# Patient Record
Sex: MALE | Race: WHITE | HISPANIC OR LATINO
[De-identification: names, ages, dates, MRNs, and addresses within clinical notes are randomized per-mention and may not be internally consistent; named-entity substitution may affect disease eponyms.]

---

## 2017-05-02 ENCOUNTER — TRANSCRIPTION ENCOUNTER (OUTPATIENT)
Age: 42
End: 2017-05-02

## 2020-05-18 PROBLEM — Z00.00 ENCOUNTER FOR PREVENTIVE HEALTH EXAMINATION: Status: ACTIVE | Noted: 2020-05-18

## 2020-05-22 ENCOUNTER — APPOINTMENT (OUTPATIENT)
Dept: ORTHOPEDIC SURGERY | Facility: CLINIC | Age: 45
End: 2020-05-22
Payer: COMMERCIAL

## 2020-05-22 DIAGNOSIS — Z87.09 PERSONAL HISTORY OF OTHER DISEASES OF THE RESPIRATORY SYSTEM: ICD-10-CM

## 2020-05-22 DIAGNOSIS — Z78.9 OTHER SPECIFIED HEALTH STATUS: ICD-10-CM

## 2020-05-22 DIAGNOSIS — Z86.79 PERSONAL HISTORY OF OTHER DISEASES OF THE CIRCULATORY SYSTEM: ICD-10-CM

## 2020-05-22 DIAGNOSIS — Z72.3 LACK OF PHYSICAL EXERCISE: ICD-10-CM

## 2020-05-22 PROCEDURE — 73590 X-RAY EXAM OF LOWER LEG: CPT | Mod: LT,TC

## 2020-05-22 PROCEDURE — 73610 X-RAY EXAM OF ANKLE: CPT | Mod: LT,TC

## 2020-05-22 PROCEDURE — 99204 OFFICE O/P NEW MOD 45 MIN: CPT

## 2020-05-22 RX ORDER — ACETAMINOPHEN 325 MG/1
TABLET, FILM COATED ORAL
Refills: 0 | Status: ACTIVE | COMMUNITY

## 2020-05-22 RX ORDER — CEPHALEXIN 750 MG/1
CAPSULE ORAL
Refills: 0 | Status: ACTIVE | COMMUNITY

## 2020-05-22 RX ORDER — ASPIRIN 325 MG/1
TABLET, FILM COATED ORAL
Refills: 0 | Status: ACTIVE | COMMUNITY

## 2020-05-22 RX ORDER — AMLODIPINE BESYLATE 5 MG/1
TABLET ORAL
Refills: 0 | Status: ACTIVE | COMMUNITY

## 2020-05-22 NOTE — PHYSICAL EXAM
[de-identified] : Left Ankle Physical Examination:\par \par General: Alert and oriented x3.  In no acute distress.  Pleasant in nature with a normal affect.  No apparent respiratory distress. \par Erythema, Warmth, Rubor: Negative\par Swelling: Positive\par \par *There is an incision going across the anterior aspect of the ankle joint and medial side of the ankle joint. Nylon sutures are intact. There is no wound dehisced since, drainage, signs of infection present coming from the wounds. He also has small focal wounds on his anterior shin and around the ankle from the external fixator.  The patient has a Steinmann pin coming out of the calcaneus on the bottom aspect of the foot.\par \par ROM:\par Not assessed today in office.\par \par Tenderness to Palpation: \par 1. Lateral Malleolus: Negative\par 2. Medial Malleolus: Positive\par 3. Proximal Fibular Pain: Negative\par 4. Heel Pain: Positive Benson pin coming out of the calcaneus on the bottom portion of the foot.\par 5. Cuboid: Negative\par 6. Navicular: Negative\par 7. Tibiotalar Joint: Negative\par 8. Subtalar Joint: Negative\par 9. Posterior Recess: Negative\par \par Tendon Pain:\par 1. Achilles: Negative\par 2. Peroneals: Negative\par 3. Posterior Tibialis: Negative\par 4. Tibialis Anterior: Negative\par \par Ligament Pain:\par 1. ATFL: Negative\par 2. CFL: Negative \par 3. PTFL: Negative\par 4. Deltoid Ligaments: Negative\par 5. Lis Franc Ligament: Negative\par \par Stability: \par 1. Anterior Drawer: Negative\par 2. Posterior Drawer: Negative\par \par Strength: 5/5 TA/GS/EHL\par \par Pulses: 2+ DP/PT Pulses\par \par Neuro: Intact motor and sensory\par \par Additional Test:\par 1. Calcaneal Squeeze Test: Negative\par 2. Syndesmosis Squeeze Test: Negative\par  [de-identified] : X-rays of the left ankle taken in office today and reviewed with the patient showed: Ankle x-rays show cemented medial malleolus with a Steinmann pin coming from the bottom portion of the calcaneus through the subtalar joint, through the tibiotalar joint into the distal third of the tibia. Small possible fracture of the distal fibula.\par \par 2 views x-rays of the tib/fib showed screw holes present in the tibia from the external fixators. Small possible distal fracture of the fibula noted on both the ankle views and tib-fib views.
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
## 2020-05-22 NOTE — DISCUSSION/SUMMARY
[de-identified] : I had a long conversation with Jorge A and his wife today the office. At this point in time Jorge A is going to continue his DVT prophylaxis with aspirin 325 mg twice a day. He was told multiple times in the office today to continue nonweightbearing as he now notes that there is a pin sticking out the bottom of his heel. We are going to get a CT scan of the left ankle to evaluate the fractures and hardware. Once the CT scan is completed we will reconvene in the office to review the CT scan and to discuss possible surgical intervention of the ankle at that time. All of his and his wife questions were answered. They understand the treatment course at this time. They understand the pathology at this time. If they have additional questions they may give the office a call.

## 2020-05-22 NOTE — PHYSICAL EXAM
[de-identified] : Left Ankle Physical Examination:\par \par General: Alert and oriented x3.  In no acute distress.  Pleasant in nature with a normal affect.  No apparent respiratory distress. \par Erythema, Warmth, Rubor: Negative\par Swelling: Positive\par \par *There is an incision going across the anterior aspect of the ankle joint and medial side of the ankle joint. Nylon sutures are intact. There is no wound dehisced since, drainage, signs of infection present coming from the wounds. He also has small focal wounds on his anterior shin and around the ankle from the external fixator.  The patient has a Steinmann pin coming out of the calcaneus on the bottom aspect of the foot.\par \par ROM:\par Not assessed today in office.\par \par Tenderness to Palpation: \par 1. Lateral Malleolus: Negative\par 2. Medial Malleolus: Positive\par 3. Proximal Fibular Pain: Negative\par 4. Heel Pain: Positive Benson pin coming out of the calcaneus on the bottom portion of the foot.\par 5. Cuboid: Negative\par 6. Navicular: Negative\par 7. Tibiotalar Joint: Negative\par 8. Subtalar Joint: Negative\par 9. Posterior Recess: Negative\par \par Tendon Pain:\par 1. Achilles: Negative\par 2. Peroneals: Negative\par 3. Posterior Tibialis: Negative\par 4. Tibialis Anterior: Negative\par \par Ligament Pain:\par 1. ATFL: Negative\par 2. CFL: Negative \par 3. PTFL: Negative\par 4. Deltoid Ligaments: Negative\par 5. Lis Franc Ligament: Negative\par \par Stability: \par 1. Anterior Drawer: Negative\par 2. Posterior Drawer: Negative\par \par Strength: 5/5 TA/GS/EHL\par \par Pulses: 2+ DP/PT Pulses\par \par Neuro: Intact motor and sensory\par \par Additional Test:\par 1. Calcaneal Squeeze Test: Negative\par 2. Syndesmosis Squeeze Test: Negative\par  [de-identified] : X-rays of the left ankle taken in office today and reviewed with the patient showed: Ankle x-rays show cemented medial malleolus with a Steinmann pin coming from the bottom portion of the calcaneus through the subtalar joint, through the tibiotalar joint into the distal third of the tibia. Small possible fracture of the distal fibula.\par \par 2 views x-rays of the tib/fib showed screw holes present in the tibia from the external fixators. Small possible distal fracture of the fibula noted on both the ankle views and tib-fib views.

## 2020-05-22 NOTE — DISCUSSION/SUMMARY
[de-identified] : I had a long conversation with Jorge A and his wife today the office. At this point in time Jorge A is going to continue his DVT prophylaxis with aspirin 325 mg twice a day. He was told multiple times in the office today to continue nonweightbearing as he now notes that there is a pin sticking out the bottom of his heel. We are going to get a CT scan of the left ankle to evaluate the fractures and hardware. Once the CT scan is completed we will reconvene in the office to review the CT scan and to discuss possible surgical intervention of the ankle at that time. All of his and his wife questions were answered. They understand the treatment course at this time. They understand the pathology at this time. If they have additional questions they may give the office a call.
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
## 2020-05-30 ENCOUNTER — APPOINTMENT (OUTPATIENT)
Dept: CT IMAGING | Facility: CLINIC | Age: 45
End: 2020-05-30
Payer: COMMERCIAL

## 2020-05-30 ENCOUNTER — OUTPATIENT (OUTPATIENT)
Dept: OUTPATIENT SERVICES | Facility: HOSPITAL | Age: 45
LOS: 1 days | End: 2020-05-30
Payer: COMMERCIAL

## 2020-05-30 DIAGNOSIS — S82.892A OTHER FRACTURE OF LEFT LOWER LEG, INITIAL ENCOUNTER FOR CLOSED FRACTURE: ICD-10-CM

## 2020-05-30 PROCEDURE — 73700 CT LOWER EXTREMITY W/O DYE: CPT | Mod: 26,LT

## 2020-05-30 PROCEDURE — 73700 CT LOWER EXTREMITY W/O DYE: CPT

## 2020-06-02 ENCOUNTER — APPOINTMENT (OUTPATIENT)
Dept: ORTHOPEDIC SURGERY | Facility: CLINIC | Age: 45
End: 2020-06-02
Payer: COMMERCIAL

## 2020-06-02 PROCEDURE — 99214 OFFICE O/P EST MOD 30 MIN: CPT

## 2020-06-02 NOTE — PHYSICAL EXAM
[de-identified] : Left Ankle Physical Examination:\par \par General: Alert and oriented x3.  In no acute distress.  Pleasant in nature with a normal affect.  No apparent respiratory distress. \par Erythema, Warmth, Rubor: Negative\par Swelling: Positive\par \par *There is an incision going across the anterior aspect of the ankle joint and medial side of the ankle joint. Nylon sutures are intact. There is no wound dehisced since, drainage, signs of infection present coming from the wounds. He also has small focal wounds on his anterior shin and around the ankle from the external fixator.  The patient has a Steinmann pin coming out of the calcaneus on the bottom aspect of the foot.\par \par ROM:\par Not assessed today in office.\par \par Tenderness to Palpation: \par 1. Lateral Malleolus: Negative\par 2. Medial Malleolus: Positive\par 3. Proximal Fibular Pain: Negative\par 4. Heel Pain: Positive Benson pin coming out of the calcaneus on the bottom portion of the foot.\par 5. Cuboid: Negative\par 6. Navicular: Negative\par 7. Tibiotalar Joint: Negative\par 8. Subtalar Joint: Negative\par 9. Posterior Recess: Negative\par \par Tendon Pain:\par 1. Achilles: Negative\par 2. Peroneals: Negative\par 3. Posterior Tibialis: Negative\par 4. Tibialis Anterior: Negative\par \par Ligament Pain:\par 1. ATFL: Negative\par 2. CFL: Negative \par 3. PTFL: Negative\par 4. Deltoid Ligaments: Negative\par 5. Lis Franc Ligament: Negative\par \par Stability: \par 1. Anterior Drawer: Negative\par 2. Posterior Drawer: Negative\par \par Strength: 5/5 TA/GS/EHL\par \par Pulses: 2+ DP/PT Pulses\par \par Neuro: Intact motor and sensory\par \par Additional Test:\par 1. Calcaneal Squeeze Test: Negative\par 2. Syndesmosis Squeeze Test: Negative\par  [de-identified] : \par   CT Ankle No Cont, Left             Final\par \par No Documents Attached\par \par \par \par \par   EXAM:  CT ANKLE ONLY LT\par \par \par PROCEDURE DATE:  05/30/2020\par \par \par \par INTERPRETATION:  CT ANKLE LEFT dated 5/30/2020 9:56 AM\par \par INDICATION: History of ankle fracture and surgery. Ankle pain.\par \par COMPARISON: None available.\par \par TECHNIQUE: CT imaging of the left ankle was performed. The data was reformatted in the axial, coronal, and sagittal planes.\par \par FINDINGS:\par \par OSSEOUS STRUCTURES: There is a plantar percutaneous pin noted which enters the anterior calcaneus, traverses the subtalar and central tibiotalar joint and terminates in the distal tibial diaphysis. There is no lucency surrounding this hardware.  There are ghost tracts in the calcaneus likely from prior external fixation device removal. There is a comminuted medial malleolus fracture with cement interposed between the fracture fragments. There is no bony bridging or callus formation. There is a healing nondisplaced distal fibular diaphyseal fracture with mild periosteal bone formation. Small well-corticated osseous densities distal to the fibula in keeping with tiny avulsion. There is an impaction fracture at the fourth TMT distal cuboid articular surface. Mild avulsion of the anterior process of the calcaneus. The tibiotalar, posterior subtalar, middle subtalar, calcaneocuboid, and talonavicular joints are intact.\par SYNOVIUM/ JOINT FLUID: There is a tibiotalar joint effusion. There are small densities within the joint suggestive of small loose bodies.\par TENDONS: Intact\par MUSCLES: Intact\par SUBCUTANEOUS SOFT TISSUES: Diffuse soft tissue edema\par \par \par IMPRESSION:\par \par ORIF of the ankle with percutaneous pin in place. Ghost tracks in keeping with prior external fixation device removal. Cemented and medial malleoli are fracture without definitive osseous fusion.\par \par Healing distal fibular diaphyseal fracture.\par \par Intra-articular fracture without definite osseous bridging at the distal cuboid with its articulation of the fourth metatarsal base. This can be seen with a Lisfranc type fracture and correlation with clinical evaluation for ligamentous laxity at the Lisfranc ligament is recommended. If warranted, MRI can be performed.\par \par Avulsion of the anterior process of the calcaneus.\par \par Small avulsion of the fibular tip.\par \par Small loose bodies within the tibiotalar joint.\par \par \par \par \par \par \par LUIS NEWTON MD,RADIOLOGY FELLOW\par This document has been electronically signed.\par FRANCISCO JAVIER GROVE M.D., ATTENDING RADIOLOGIST\par This document has been electronically signed. Jun 1 2020 11:01AM\par \par  \par \par  Ordered by: SUKHWINDER DORADO       Collected/Examined: [id number] 09:56AM       \par Verified by: SUKHWINDER DORADO 01Jun2020 03:35PM       \par  Result Communication: No patient communication needed at this time;\par Stage: Final       \par  Performed at: [facility] [place]       Resulted: 01Jun2020 11:04AM       Last Updated: 01Jun2020 03:35PM       Accession: [id number]

## 2020-06-02 NOTE — HISTORY OF PRESENT ILLNESS
[FreeTextEntry1] : The patient is accompanied by his wife with regards to followup evaluation to the left ankle. He has been nonweightbearing and has been compliant with his overall structures. Continues to take anticoagulation as recommended. He denies any fevers chills or night sweats.

## 2020-06-02 NOTE — DISCUSSION/SUMMARY
[FreeTextEntry1] : I had a lengthy conversation with the patient regarding his left ankle fracture. I provided him with a discussion surrounding the medial malleolus antibiotic cement that was placed at the outside institution and the possible need for bone graft and the washouts due to the high likelihood of infection. At this point I will get in touch with him on our best method of moving forward from a surgical standpoint. He is aware that he will need medical clearance as well as coated testing.All questions were answered and the patient verbalized understanding.  The patient is in agreement with this treatment plan.

## 2020-06-04 ENCOUNTER — APPOINTMENT (OUTPATIENT)
Dept: ORTHOPEDIC SURGERY | Facility: CLINIC | Age: 45
End: 2020-06-04
Payer: COMMERCIAL

## 2020-06-04 PROCEDURE — 99442: CPT

## 2020-06-04 RX ORDER — OXYCODONE AND ACETAMINOPHEN 5; 325 MG/1; MG/1
5-325 TABLET ORAL
Qty: 20 | Refills: 0 | Status: ACTIVE | COMMUNITY
Start: 2020-05-12

## 2020-06-09 ENCOUNTER — OUTPATIENT (OUTPATIENT)
Dept: OUTPATIENT SERVICES | Facility: HOSPITAL | Age: 45
LOS: 1 days | End: 2020-06-09
Payer: COMMERCIAL

## 2020-06-09 DIAGNOSIS — Z11.59 ENCOUNTER FOR SCREENING FOR OTHER VIRAL DISEASES: ICD-10-CM

## 2020-06-09 PROCEDURE — U0003: CPT

## 2020-06-10 DIAGNOSIS — Z11.59 ENCOUNTER FOR SCREENING FOR OTHER VIRAL DISEASES: ICD-10-CM

## 2020-06-10 LAB — SARS-COV-2 RNA SPEC QL NAA+PROBE: SIGNIFICANT CHANGE UP

## 2020-06-11 NOTE — ASU PATIENT PROFILE, ADULT - REASON FOR ADMISSION, PROFILE
Left ankle hardware removal and medial ankle irrigation and debridement ,possible wound vac placement,bone biopsy media tibia

## 2020-06-12 ENCOUNTER — APPOINTMENT (OUTPATIENT)
Dept: ORTHOPEDIC SURGERY | Facility: HOSPITAL | Age: 45
End: 2020-06-12

## 2020-06-12 ENCOUNTER — OUTPATIENT (OUTPATIENT)
Dept: INPATIENT UNIT | Facility: HOSPITAL | Age: 45
LOS: 1 days | Discharge: HOME | End: 2020-06-12
Payer: COMMERCIAL

## 2020-06-12 ENCOUNTER — RESULT REVIEW (OUTPATIENT)
Age: 45
End: 2020-06-12

## 2020-06-12 VITALS
HEART RATE: 85 BPM | OXYGEN SATURATION: 98 % | TEMPERATURE: 98 F | SYSTOLIC BLOOD PRESSURE: 124 MMHG | DIASTOLIC BLOOD PRESSURE: 65 MMHG | RESPIRATION RATE: 15 BRPM

## 2020-06-12 VITALS
OXYGEN SATURATION: 100 % | WEIGHT: 214.95 LBS | HEIGHT: 68 IN | RESPIRATION RATE: 16 BRPM | SYSTOLIC BLOOD PRESSURE: 138 MMHG | HEART RATE: 96 BPM | TEMPERATURE: 99 F | DIASTOLIC BLOOD PRESSURE: 88 MMHG

## 2020-06-12 DIAGNOSIS — S82.892A OTHER FRACTURE OF LEFT LOWER LEG, INITIAL ENCOUNTER FOR CLOSED FRACTURE: ICD-10-CM

## 2020-06-12 DIAGNOSIS — Z98.890 OTHER SPECIFIED POSTPROCEDURAL STATES: ICD-10-CM

## 2020-06-12 DIAGNOSIS — Z98.890 OTHER SPECIFIED POSTPROCEDURAL STATES: Chronic | ICD-10-CM

## 2020-06-12 PROCEDURE — 87116 MYCOBACTERIA CULTURE: CPT

## 2020-06-12 PROCEDURE — 76000 FLUOROSCOPY <1 HR PHYS/QHP: CPT

## 2020-06-12 PROCEDURE — 87102 FUNGUS ISOLATION CULTURE: CPT

## 2020-06-12 PROCEDURE — 99202 OFFICE O/P NEW SF 15 MIN: CPT

## 2020-06-12 PROCEDURE — 11012 DEB SKIN BONE AT FX SITE: CPT | Mod: 59,LT

## 2020-06-12 PROCEDURE — 87015 SPECIMEN INFECT AGNT CONCNTJ: CPT

## 2020-06-12 PROCEDURE — 20680 REMOVAL OF IMPLANT DEEP: CPT | Mod: 59,LT

## 2020-06-12 PROCEDURE — 27603 DRAIN LOWER LEG LESION: CPT | Mod: LT

## 2020-06-12 PROCEDURE — 87206 SMEAR FLUORESCENT/ACID STAI: CPT

## 2020-06-12 PROCEDURE — 87075 CULTR BACTERIA EXCEPT BLOOD: CPT

## 2020-06-12 PROCEDURE — 87070 CULTURE OTHR SPECIMN AEROBIC: CPT

## 2020-06-12 RX ORDER — LANOLIN ALCOHOL/MO/W.PET/CERES
3 CREAM (GRAM) TOPICAL AT BEDTIME
Refills: 0 | Status: DISCONTINUED | OUTPATIENT
Start: 2020-06-12 | End: 2020-06-12

## 2020-06-12 RX ORDER — CEFAZOLIN SODIUM 1 G
2000 VIAL (EA) INJECTION EVERY 8 HOURS
Refills: 0 | Status: DISCONTINUED | OUTPATIENT
Start: 2020-06-12 | End: 2020-06-12

## 2020-06-12 RX ORDER — ENOXAPARIN SODIUM 100 MG/ML
40 INJECTION SUBCUTANEOUS ONCE
Refills: 0 | Status: DISCONTINUED | OUTPATIENT
Start: 2020-06-12 | End: 2020-06-12

## 2020-06-12 RX ORDER — OXYCODONE HYDROCHLORIDE 5 MG/1
5 TABLET ORAL ONCE
Refills: 0 | Status: DISCONTINUED | OUTPATIENT
Start: 2020-06-12 | End: 2020-06-12

## 2020-06-12 RX ORDER — SODIUM CHLORIDE 9 MG/ML
1000 INJECTION, SOLUTION INTRAVENOUS
Refills: 0 | Status: DISCONTINUED | OUTPATIENT
Start: 2020-06-12 | End: 2020-06-12

## 2020-06-12 RX ORDER — CEPHALEXIN 500 MG
0 CAPSULE ORAL
Qty: 0 | Refills: 0 | DISCHARGE

## 2020-06-12 RX ORDER — ENOXAPARIN SODIUM 100 MG/ML
40 INJECTION SUBCUTANEOUS
Qty: 14 | Refills: 1
Start: 2020-06-12 | End: 2020-07-09

## 2020-06-12 RX ORDER — ONDANSETRON 8 MG/1
1 TABLET, FILM COATED ORAL
Qty: 10 | Refills: 0
Start: 2020-06-12 | End: 2020-06-18

## 2020-06-12 RX ORDER — AMLODIPINE BESYLATE 2.5 MG/1
0 TABLET ORAL
Qty: 0 | Refills: 0 | DISCHARGE

## 2020-06-12 RX ORDER — GABAPENTIN 400 MG/1
0 CAPSULE ORAL
Qty: 0 | Refills: 0 | DISCHARGE

## 2020-06-12 RX ORDER — OXYCODONE HYDROCHLORIDE 5 MG/1
2.5 TABLET ORAL EVERY 4 HOURS
Refills: 0 | Status: DISCONTINUED | OUTPATIENT
Start: 2020-06-12 | End: 2020-06-12

## 2020-06-12 RX ORDER — HYDROMORPHONE HYDROCHLORIDE 2 MG/ML
0.5 INJECTION INTRAMUSCULAR; INTRAVENOUS; SUBCUTANEOUS
Refills: 0 | Status: DISCONTINUED | OUTPATIENT
Start: 2020-06-12 | End: 2020-06-12

## 2020-06-12 RX ORDER — OXYCODONE HYDROCHLORIDE 5 MG/1
5 TABLET ORAL EVERY 4 HOURS
Refills: 0 | Status: DISCONTINUED | OUTPATIENT
Start: 2020-06-12 | End: 2020-06-12

## 2020-06-12 RX ORDER — FENTANYL CITRATE 50 UG/ML
50 INJECTION INTRAVENOUS
Refills: 0 | Status: DISCONTINUED | OUTPATIENT
Start: 2020-06-12 | End: 2020-06-12

## 2020-06-12 RX ORDER — DOCUSATE SODIUM 100 MG
1 CAPSULE ORAL
Qty: 28 | Refills: 0
Start: 2020-06-12 | End: 2020-06-25

## 2020-06-12 RX ORDER — CIPROFLOXACIN LACTATE 400MG/40ML
1 VIAL (ML) INTRAVENOUS
Qty: 28 | Refills: 0
Start: 2020-06-12 | End: 2020-06-25

## 2020-06-12 RX ORDER — MORPHINE SULFATE 50 MG/1
2 CAPSULE, EXTENDED RELEASE ORAL
Refills: 0 | Status: DISCONTINUED | OUTPATIENT
Start: 2020-06-12 | End: 2020-06-12

## 2020-06-12 RX ORDER — ACETAMINOPHEN 500 MG
1000 TABLET ORAL ONCE
Refills: 0 | Status: COMPLETED | OUTPATIENT
Start: 2020-06-12 | End: 2020-06-12

## 2020-06-12 RX ORDER — ONDANSETRON 8 MG/1
4 TABLET, FILM COATED ORAL ONCE
Refills: 0 | Status: DISCONTINUED | OUTPATIENT
Start: 2020-06-12 | End: 2020-06-12

## 2020-06-12 RX ORDER — ASPIRIN/CALCIUM CARB/MAGNESIUM 324 MG
0 TABLET ORAL
Qty: 0 | Refills: 0 | DISCHARGE

## 2020-06-12 RX ORDER — FAMOTIDINE 10 MG/ML
20 INJECTION INTRAVENOUS EVERY 24 HOURS
Refills: 0 | Status: DISCONTINUED | OUTPATIENT
Start: 2020-06-12 | End: 2020-06-12

## 2020-06-12 RX ORDER — AMLODIPINE BESYLATE 2.5 MG/1
5 TABLET ORAL DAILY
Refills: 0 | Status: DISCONTINUED | OUTPATIENT
Start: 2020-06-12 | End: 2020-06-12

## 2020-06-12 RX ORDER — SODIUM CHLORIDE 9 MG/ML
1000 INJECTION INTRAMUSCULAR; INTRAVENOUS; SUBCUTANEOUS
Refills: 0 | Status: DISCONTINUED | OUTPATIENT
Start: 2020-06-12 | End: 2020-06-12

## 2020-06-12 RX ORDER — CEFUROXIME AXETIL 250 MG
1 TABLET ORAL
Qty: 28 | Refills: 0
Start: 2020-06-12 | End: 2020-06-25

## 2020-06-12 RX ADMIN — FENTANYL CITRATE 50 MICROGRAM(S): 50 INJECTION INTRAVENOUS at 13:22

## 2020-06-12 RX ADMIN — FENTANYL CITRATE 50 MICROGRAM(S): 50 INJECTION INTRAVENOUS at 13:42

## 2020-06-12 RX ADMIN — Medication 400 MILLIGRAM(S): at 13:22

## 2020-06-12 RX ADMIN — OXYCODONE HYDROCHLORIDE 5 MILLIGRAM(S): 5 TABLET ORAL at 13:22

## 2020-06-12 NOTE — BRIEF OPERATIVE NOTE - OPERATION/FINDINGS
Left ankle removal of hardware with irrigation and debridement and bone biopsy of medial tibia. See operative report for complete details.

## 2020-06-12 NOTE — ASU DISCHARGE PLAN (ADULT/PEDIATRIC) - ASU DC SPECIAL INSTRUCTIONSFT
Post-Operative Instructions    PRESCRIPTIONS: your post-operative medications have been handed to you or sent to the pharmacy you indicated at your pre-operative visit.  If you have any difficulty obtaining your post-operative medications or have any questions, please call the office at [phone].      PAIN MANAGEMENT: You should expect to have discomfort for the first week or so after surgery. Pain medication should be taken to help alleviate the pain so that you are comfortable and can participate in physical therapy.  Take the medication as directed.  You may decrease the amount of pain medication, as tolerated, when pain improves.  You must exercise caution when operating a motor vehicle. You have been prescribed one or more of the following as indicated on your Med Rec form thta the Nurse will go over with you:  [X] Oxycodone 5mg 1-2 tablets by mouth every 4 to 6 hours as needed for pain  Oxycodone is a short-acting pain medication routinely prescribed after surgery.  It is the pain medication found in “Percocet,” which is a combination of oxycodone and Tylenol.  If you were prescribed oxycodone, you may take Tylenol in addition to this medication, if needed for pain control.  [  ] Oxycontin 10mg by mouth every 12 hours for 5 days  Oxycontin is a long-acting pain medication.  It is sometimes prescribed after longer surgeries. If you were prescribed this medication, you should take it twice a day for the first 5 days after surgery to help with pain control.  [  ] Vicodin or Norco (Hydrocodone 5mg/Tylenol 325mg) 1-2 tablets by mouth every 4-6 hours as needed for pain  Vicodin and Norco are short acting pain medications sometimes prescribed after surgery.  If you were prescribed this medication, you may take 1-2 tablets every 4-6 hours as needed for pain.  This medication already contains Tylenol.  You should NOT take any additional Tylenol (acetaminophen) if you are taking these medications.    NAUSEA: Nausea is a common side effect of anesthesia and pain medications.  You may take the below medication if you are experiencing nausea after surgery.  If you continue to experience nausea or vomiting more than 24 hours after surgery, please call the office.  [X] Ondansetron 4mg by mouth every 4 hours as needed for nausea    CONSTIPATION: common side effect of anesthesia and pain medications.  You should take Colace three times daily, as long as you are taking narcotic pain medications after surgery, such as oxycodone, oxycontin, vicodin, or norco.  [X] Colace 100mg by mouth three times daily    DVT PROPHYLAXIS (PREVENTION OF BLOOD CLOTS): Aspirin EC can reduce the risk of blood clots after surgery, particularly after surgery on the legs, ankles and feet.  If you have been prescribed Aspirn, it is essential that you take this medication.  If you already are on an anticoagulant (blood thinner) such as Xarelto, Coumadin, Warfarin, Eliquis - you should resume you home "blood-thinner" in place of the Aspirn.  [  ] Aspirin 325mg ENTERIC COATED (EC) by mouth once daily for 2-4 weeks (depending on surgical procedure)  LOVENOX PER ANTICOAGULATION TEAM    ANTIBIOTICS - CIPROFLOXACIN AND CEFUROXIME FOR 2 WEEKS, TAKE AS PRESCRIBED    ACTIVITY: You should be up and moving as much and as often as possible! Do NOT walk or put bodyweight on your splint or surgical side. Use Crutches or walker. You must keep your bandage/splint dry. Do NOT get it wet. IF you shower it MUST be covered tightly with a garbage bag. Otherwise sponge bath is advised. You do NOT want wet bandages on your skin as they can cause skin breakdown under the splint.    BANDAGE: Your bandage will be chaged in the office. Do NOT remove it yourself. IF it gets damaged or wet (soaked) call. Follow up about 7-10 days in office or as otherwise advised by Dr. Austin if different.

## 2020-06-12 NOTE — BRIEF OPERATIVE NOTE - NSICDXBRIEFPREOP_GEN_ALL_CORE_FT
PRE-OP DIAGNOSIS:  Hardware complicating wound infection 12-Jun-2020 13:10:27 Left lower extremity Cally Chen  Medial malleolar fracture 12-Jun-2020 13:10:57 Left lower extremity Cally Chen

## 2020-06-12 NOTE — CONSULT NOTE ADULT - SUBJECTIVE AND OBJECTIVE BOX
HPI:  Patient is a 44y old male who presents with a chief complaint of ;eft ankle pain s/p fracture repair r/o infection now s/p I&D, removal hardware, bone biopsy.    Consulted by Dr. Austin for VTE prophylaxis, risk stratification, and anticoagulation management.    PAST MEDICAL & SURGICAL HISTORY:  Hypertension  Asthma  History of ankle surgery    Interval History:  : Patient seen at bedside in PACU preparing to go home soon. Explained risk for VTE due to non weightbearing status with patient. He is amenable to Lovenox injections but would like to be switched to ASA sooner than later as long as he is active and ambulating. Explained will follow up as outpatient with labs and to assess ambulatory status. Will provide with Lovenox kit and send Lovenox educational video so he and his wife can view together. He denies any issues.    BMI: 32.7    CrCl:    Caprini VTE Risk Score:  CAPRINI SCORE  AGE RELATED RISK FACTORS                                                       MOBILITY RELATED FACTORS  [x ] Age 41-60 years                                            (1 Point)                  [ ] Bed rest                                                        (1 Point)  [ ] Age: 61-74 years                                           (2 Points)                [x ] Plaster cast                                                   (2 Points)  [ ] Age= 75 years                                              (3 Points)                 [ ] Bed bound for more than 72 hours                   (2 Points)    DISEASE RELATED RISK FACTORS                                               GENDER SPECIFIC FACTORS  [ ] Edema in the lower extremities                       (1 Point)           [ ] Pregnancy                                                            (1 Point)  [ ] Varicose veins                                               (1 Point)                  [ ] Post-partum < 6 weeks                                      (1 Point)             [x ] BMI > 25 Kg/m2                                            (1 Point)                  [ ] Hormonal therapy or oral contraception       (1 Point)                 [ ] Sepsis (in the previous month)                        (1 Point)             [ ] History of pregnancy complications                (1Point)  [ ] Pneumonia or serious lung disease                                             [ ] Unexplained or recurrent  (=/>3), premature                                 (In the previous month)                               (1 Point)                birth with toxemia or growth-restricted infant (1 Point)  [ ] Abnormal pulmonary function test            (1 Point)                                   SURGERY RELATED RISK FACTORS  [ ] Acute myocardial infarction                       (1 Point)                  [ ]  Section                                         (1 Point)  [ ] Congestive heart failure (in the previous month) (1 Point)   [ ] Minor surgery   lasting <45 minutes       (1 Point)   [ ] Inflammatory bowel disease                             (1 Point)          [ ] Arthroscopic surgery                                  (2 Points)  [ ] Central venous access                                    (2 Points)            [x ] General surgery lasting >45 minutes      (2 Points)       [ ] Stroke (in the previous month)                  (5 Points)            [ ] Elective major lower extremity arthroplasty (5 Points)                                   [  ] Malignancy (present or past include skin melanoma                                          but exclude  basal skin cell)    (2 points)                                      TRAUMA RELATED RISK FACTORS                HEMATOLOGY RELATED FACTORS                                  [ ] Fracture of the hip, pelvis, or leg                       (5 Points)  [ ] Prior episodes of VTE                                     (3 Points)          [ ] Acute spinal cord injury (in the previous month)  (5 Points)  [ ] Positive family history for VTE                         (3 Points)       [ ] Paralysis (less than 1 month)                          (5 Points)  [ ] Prothrombin 57582 A                                      (3 Points)         [ ] Multiple Trauma (within 1month)                 (5Points)                                                                                                                                                                [ ] Factor V Leiden                                          (3 Points)                                OTHER RISK FACTORS                          [ ] Lupus anticoagulants                                     (3 Points)                       [ ] BMI > 40                          (1 Point)                                                         [ ] Anticardiolipin antibodies                                (3 Points)                   [ ] Smoking                              (1Point)                                                [ ] High homocysteine in the blood                      (3 Points)                [  ] Diabetes requiring insulin (1point)                         [ ] Other congenital or acquired thrombophilia       (3 Points)          [  ] Chemotherapy                   (1 Point)  [ ] Heparin induced thrombocytopenia                  (3 Points)             [  ] Blood Transfusion                (1 point)                                                                                                             Total Score [     6     ]                                                                                                                                                                                                                                                                                                                                                                                                                                           IMPROVE Bleeding Risk Score:2.5      Falls Risk:   High (  )  Mod ( x )  Low (  )      FAMILY HISTORY:    Denies any personal or familial history of clotting or bleeding disorders.    Allergies    ibuprofen (Unknown)  Ibuprofen IB (Other)    Intolerances        REVIEW OF SYSTEMS    (  )Fever	     (  )Constipation	(  )SOB				(  )Headache	(  )Dysuria  (  )Chills	     (  )Melena	(  )Dyspnea present on exertion (  )Dizziness   (  )Polyuria  (  )Nausea	     (  )Hematochezia	(  )Cough         (  )Syncope   	         (  )Hematuria  (  )Vomiting    (  )Chest Pain	(  )Wheezing			(  )Weakness  (  )Diarrhea     (  )Palpitations	(  )Anorexia			( x )Myalgia    Pertinent positives in HPI and daily subjective. All other systems negative.      PHYSICAL EXAM:    Constitutional: Appears Well    Neurological: A& O x 3    Skin: Warm    Respiratory and Thorax: normal effort; Breath sounds: normal; No rales/wheezing/rhonchi  	  Cardiovascular: S1, S2, regular, NMBR	    Gastrointestinal: BS + x 4Q, nontender	    Musculoskeletal:   General Right:   no muscle/joint tenderness,   normal tone, no joint swelling,   ROM: full	    General Left:   no muscle/joint tenderness,   normal tone, no joint swelling,   ROM: limited    LLE: plaster cast with ace    Lower extrems:   Right: no calf tenderness              negative cande's sign               + pedal pulses    Left:   no calf tenderness              negative cande's sign               + pedal pulses        			    MEDICATIONS  (STANDING):  amLODIPine   Tablet 5 milliGRAM(s) Oral daily  ceFAZolin   IVPB 2000 milliGRAM(s) IV Intermittent every 8 hours  enoxaparin Injectable 40 milliGRAM(s) SubCutaneous once  lactated ringers. 1000 milliLiter(s) IV Continuous <Continuous>  sodium chloride 0.9%. 1000 milliLiter(s) IV Continuous <Continuous>        Vital Signs Last 24 Hrs  T(C): 37 (2020 10:28), Max: 37 (2020 10:28)  T(F): 98.6 (2020 10:28), Max: 98.6 (2020 10:28)  HR: 89 (2020 14:15) (80 - 96)  BP: 128/71 (2020 14:15) (128/71 - 154/97)  BP(mean): --  RR: 15 (2020 14:15) (12 - 16)  SpO2: 99% (2020 14:15) (99% - 100%)      **Current DVT Prophylaxis:    LMWH                   ( x )  Heparin SQ           (  )  Coumadin             (  )  Xarelto                  (  )  Eliquis                   (  )  Venodynes           ( x )  Ambulation          (x  )  UFH                       (  )  ECASA                   (  )  Contraindicated  (  )

## 2020-06-12 NOTE — ASU DISCHARGE PLAN (ADULT/PEDIATRIC) - CALL YOUR DOCTOR IF YOU HAVE ANY OF THE FOLLOWING:
Fever greater than (need to indicate Fahrenheit or Celsius)/Numbness, tingling, color or temperature change to extremity/Wound/Surgical Site with redness, or foul smelling discharge or pus/Bleeding that does not stop

## 2020-06-12 NOTE — CONSULT NOTE ADULT - ASSESSMENT
This is a 44 year old male s/p left ankle I&D, removal hardware and bone biopsy by Dr. Austin with moderate risk for VTE due to BMI, age, mobility status, and plaster cast. He is low risk for bleeding.    Recommend Lovenox 40 mg Sq daily for at least the first two weeks post operatively. He is relatively young and healthy and if he is ambulating and not sedentary after that two week period, may likely switch over to ASA.    Plan:  ::Lovenox 40mg SQ daily x 14 days then reevaluate if switch to ASA  ::Lab at home- CBC BMP next week  ::Script sent to pharmacy  :;Enc ambulation at home    Will follow up as outpatient via phone next week.  Dispo: Home today
45 y/o male with h/o HTN, s/p recent left ankle open fracture s/p ORIF presented to the hospital for elective left ankle hardware removal. He reported left ankle pain. He underwent left ankle hardware removal. THe orthopedic team has a concern of possible underlying ankle infection since the fracture was open; however no overt signs of infection noted at the time of surgery.     1. Left ankle prior open fracture s/p ORIF now with hardware removed.   -possible underlying ankle infection/OM  -cultures were sent off intraoperative; no overt signs of infection reported from surgery  -case reviewed with Dr. Austin and orthopedic team in detail  -start ceftin 500 mg PO q12h and cipro 500 mg PO q12h for 14 days  -reason for abx use and side effects reviewed with patient; monitor BMP   -monitor for tendinitis and cipro related side effects; patient instructed  -if surgical cultures will show growth, abx regimen will have to be reevaluated and consideration for PICC line and IV abx therapy   -old chart reviewed to assess prior cultures  -wound care per ortho  -monitor temps  -f/u CBC  -supportive care  2. Other issues:   -care per medicine

## 2020-06-12 NOTE — CONSULT NOTE ADULT - SUBJECTIVE AND OBJECTIVE BOX
Patient is a 44y old  Male who presents with a chief complaint of ankle hardware removal  HPI:    left ankle hardware removal and medial ankle irrigation and debridement ,possible wound vac placement,bone biopsy media tibia    PMH: as above  PSH: as above  Meds: per reconciliation sheet, noted below  MEDICATIONS  (STANDING):  acetaminophen  IVPB .. 1000 milliGRAM(s) IV Intermittent once  amLODIPine   Tablet 5 milliGRAM(s) Oral daily  ceFAZolin   IVPB 2000 milliGRAM(s) IV Intermittent every 8 hours  lactated ringers. 1000 milliLiter(s) (125 mL/Hr) IV Continuous <Continuous>  sodium chloride 0.9%. 1000 milliLiter(s) (125 mL/Hr) IV Continuous <Continuous>    MEDICATIONS  (PRN):  famotidine Injectable 20 milliGRAM(s) IV Push every 24 hours PRN acid reflux  fentaNYL    Injectable 50 MICROGram(s) IV Push every 15 minutes PRN Severe Pain (7 - 10)  HYDROmorphone  Injectable 0.5 milliGRAM(s) IV Push every 10 minutes PRN Moderate Pain (4 - 6)  melatonin 3 milliGRAM(s) Oral at bedtime PRN Insomnia  morphine  - Injectable 2 milliGRAM(s) IV Push every 3 hours PRN Severe Pain (7 - 10)  ondansetron Injectable 4 milliGRAM(s) IV Push once PRN Nausea and/or Vomiting  oxyCODONE    IR 5 milliGRAM(s) Oral once PRN Moderate Pain (4 - 6)  oxyCODONE    IR 2.5 milliGRAM(s) Oral every 4 hours PRN Mild Pain (1 - 3)  oxyCODONE    IR 5 milliGRAM(s) Oral every 4 hours PRN Moderate Pain (4 - 6)    Allergies    ibuprofen (Unknown)  Ibuprofen IB (Other)    Intolerances      Social: no smoking, no alcohol, no illegal drugs; no recent travel, no exposure to TB  FAMILY HISTORY:    no history of premature cardiovascular disease in first degree relatives    ROS: the patient denies fever, no chills, no HA, no seizures, no dizziness, no sore throat, no nasal congestion, no blurry vision, no CP, no palpitations, no SOB, no cough, no abdominal pain, no diarrhea, no N/V, no dysuria, no leg pain, no claudication, no rash, no joint aches, no rectal pain or bleeding, no night sweats  All other systems reviewed and are negative    Vital Signs Last 24 Hrs  T(C): 37 (12 Jun 2020 10:28), Max: 37 (12 Jun 2020 10:28)  T(F): 98.6 (12 Jun 2020 10:28), Max: 98.6 (12 Jun 2020 10:28)  HR: 96 (12 Jun 2020 10:28) (96 - 96)  BP: 138/88 (12 Jun 2020 10:28) (138/88 - 138/88)  BP(mean): --  RR: 16 (12 Jun 2020 10:28) (16 - 16)  SpO2: 100% (12 Jun 2020 10:28) (100% - 100%)  Daily Height in cm: 172.72 (12 Jun 2020 10:28)    Daily     PE:    Constitutional:  No acute distress  HEENT: NC/AT, EOMI, PERRLA, conjunctivae clear; ears and nose atraumatic; pharynx benign  Neck: supple; thyroid not palpable  Back: no tenderness  Respiratory: respiratory effort normal; clear to auscultation  Cardiovascular: S1S2 regular, no murmurs  Abdomen: soft, not tender, not distended, positive BS; no liver or spleen organomegaly  Genitourinary: no suprapubic tenderness  Lymphatic: no LN palpable  Musculoskeletal: no muscle tenderness, no joint swelling or tenderness  Extremities: no pedal edema  Neurological/ Psychiatric: AxOx3, judgement and insight normal; moving all extremities  Skin: no rashes; no palpable lesions    Labs: all available labs reviewed                   COVID-19 PCR: Bl[place]essa (06-09-20 @ 10:44)    Radiology: all available radiological tests reviewed    Advanced directives addressed: full resuscitation Patient is a 44y old  Male who presents with a chief complaint of ankle hardware removal  HPI:  43 y/o male with h/o HTN, s/p recent left ankle open fracture s/p ORIF presented to the hospital for elective left ankle hardware removal. He reported left ankle pain. He underwent left ankle hardware removal. THe orthopedic team has a concern of possible underlying ankle infection since the fracture was open; however no overt signs of infection noted at the time of surgery.   H underwent left ankle hardware removal and medial ankle irrigation and debridement, bone biopsy and cultures.     PMH: as above  PSH: as above  Meds: per reconciliation sheet, noted below  MEDICATIONS  (STANDING):  acetaminophen  IVPB .. 1000 milliGRAM(s) IV Intermittent once  amLODIPine   Tablet 5 milliGRAM(s) Oral daily  ceFAZolin   IVPB 2000 milliGRAM(s) IV Intermittent every 8 hours  lactated ringers. 1000 milliLiter(s) (125 mL/Hr) IV Continuous <Continuous>  sodium chloride 0.9%. 1000 milliLiter(s) (125 mL/Hr) IV Continuous <Continuous>    MEDICATIONS  (PRN):  famotidine Injectable 20 milliGRAM(s) IV Push every 24 hours PRN acid reflux  fentaNYL    Injectable 50 MICROGram(s) IV Push every 15 minutes PRN Severe Pain (7 - 10)  HYDROmorphone  Injectable 0.5 milliGRAM(s) IV Push every 10 minutes PRN Moderate Pain (4 - 6)  melatonin 3 milliGRAM(s) Oral at bedtime PRN Insomnia  morphine  - Injectable 2 milliGRAM(s) IV Push every 3 hours PRN Severe Pain (7 - 10)  ondansetron Injectable 4 milliGRAM(s) IV Push once PRN Nausea and/or Vomiting  oxyCODONE    IR 5 milliGRAM(s) Oral once PRN Moderate Pain (4 - 6)  oxyCODONE    IR 2.5 milliGRAM(s) Oral every 4 hours PRN Mild Pain (1 - 3)  oxyCODONE    IR 5 milliGRAM(s) Oral every 4 hours PRN Moderate Pain (4 - 6)    Allergies    ibuprofen (Unknown)  Ibuprofen IB (Other)    Intolerances      Social: no smoking, no alcohol, no illegal drugs; no recent travel, no exposure to TB  FAMILY HISTORY:    no history of premature cardiovascular disease in first degree relatives    ROS: the patient denies fever, no chills, no HA, no seizures, no dizziness, no sore throat, no nasal congestion, no blurry vision, no CP, no palpitations, no SOB, no cough, no abdominal pain, no diarrhea, no N/V, no dysuria, no leg pain, no claudication, no rash, no joint aches, no rectal pain or bleeding, no night sweats; has left ankle discomfort  All other systems reviewed and are negative    Vital Signs Last 24 Hrs  T(C): 37 (12 Jun 2020 10:28), Max: 37 (12 Jun 2020 10:28)  T(F): 98.6 (12 Jun 2020 10:28), Max: 98.6 (12 Jun 2020 10:28)  HR: 96 (12 Jun 2020 10:28) (96 - 96)  BP: 138/88 (12 Jun 2020 10:28) (138/88 - 138/88)  BP(mean): --  RR: 16 (12 Jun 2020 10:28) (16 - 16)  SpO2: 100% (12 Jun 2020 10:28) (100% - 100%)  Daily Height in cm: 172.72 (12 Jun 2020 10:28)    Daily     PE:    Constitutional:  No acute distress  HEENT: NC/AT, EOMI, PERRLA, conjunctivae clear; ears and nose atraumatic; pharynx benign  Neck: supple; thyroid not palpable  Back: no tenderness  Respiratory: respiratory effort normal; clear to auscultation  Cardiovascular: S1S2 regular, no murmurs  Abdomen: soft, not tender, not distended, positive BS; no liver or spleen organomegaly  Genitourinary: no suprapubic tenderness  Lymphatic: no LN palpable  Musculoskeletal: no muscle tenderness, no joint swelling or tenderness  Extremities: no pedal edema  Left ankle dressed  Neurological/ Psychiatric: AxOx3, judgement and insight normal; moving all extremities  Skin: no rashes; no palpable lesions    Labs: all available labs reviewed      COVID-19 PCR: Jacob (06-09-20 @ 10:44)    Radiology: all available radiological tests reviewed    Advanced directives addressed: full resuscitation

## 2020-06-12 NOTE — BRIEF OPERATIVE NOTE - NSICDXBRIEFPOSTOP_GEN_ALL_CORE_FT
POST-OP DIAGNOSIS:  Medial malleolar fracture 12-Jun-2020 13:11:29 Left lower extremity Cally Chen  Hardware complicating wound infection 12-Jun-2020 13:11:07 Left lower extremity Cally Chen

## 2020-06-12 NOTE — ASU DISCHARGE PLAN (ADULT/PEDIATRIC) - CARE PROVIDER_API CALL
Gibran Austin  ORTHOPAEDIC SURGERY  [address] [place] [address]  Phone: [phone]  Fax: [phone]  Follow Up Time:

## 2020-06-12 NOTE — BRIEF OPERATIVE NOTE - SPECIMENS
Left ankle wound culture; Left ankle hardware culture from hardware; Left distal medial tibia bone culture #1, Left distal medial tibia bone culture #2, Left bone biopsy medial tibia Left ankle wound culture; Left ankle culture from hardware; Left distal medial tibia bone culture #1, Left distal medial tibia bone culture #2, Left bone biopsy medial tibia

## 2020-06-12 NOTE — BRIEF OPERATIVE NOTE - NSICDXBRIEFPROCEDURE_GEN_ALL_CORE_FT
PROCEDURES:  Biopsy of bone of left ankle 12-Jun-2020 13:16:10  Cally Chen  Irrigation and debridement, bone, lower extremity 12-Jun-2020 13:14:00 Left ankle Cally Chen  Removal of hardware from extremity 12-Jun-2020 13:12:58 Left ankle Cally Chen

## 2020-06-15 ENCOUNTER — RX RENEWAL (OUTPATIENT)
Age: 45
End: 2020-06-15

## 2020-06-18 DIAGNOSIS — Z79.82 LONG TERM (CURRENT) USE OF ASPIRIN: ICD-10-CM
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
## 2020-06-18 DIAGNOSIS — T84.623A INFECTION AND INFLAMMATORY REACTION DUE TO INTERNAL FIXATION DEVICE OF LEFT TIBIA, INITIAL ENCOUNTER: ICD-10-CM
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
## 2020-06-18 DIAGNOSIS — F17.290 NICOTINE DEPENDENCE, OTHER TOBACCO PRODUCT, UNCOMPLICATED: ICD-10-CM

## 2020-06-18 DIAGNOSIS — J45.909 UNSPECIFIED ASTHMA, UNCOMPLICATED: ICD-10-CM

## 2020-06-18 DIAGNOSIS — I10 ESSENTIAL (PRIMARY) HYPERTENSION: ICD-10-CM

## 2020-06-22 ENCOUNTER — APPOINTMENT (OUTPATIENT)
Dept: ORTHOPEDIC SURGERY | Facility: CLINIC | Age: 45
End: 2020-06-22
Payer: COMMERCIAL

## 2020-06-22 PROBLEM — I10 ESSENTIAL (PRIMARY) HYPERTENSION: Chronic | Status: ACTIVE | Noted: 2020-06-11

## 2020-06-22 PROBLEM — J45.909 UNSPECIFIED ASTHMA, UNCOMPLICATED: Chronic | Status: ACTIVE | Noted: 2020-06-11

## 2020-06-22 PROCEDURE — 73610 X-RAY EXAM OF ANKLE: CPT | Mod: LT

## 2020-06-22 PROCEDURE — 99024 POSTOP FOLLOW-UP VISIT: CPT

## 2020-06-22 PROCEDURE — 97760 ORTHOTIC MGMT&TRAING 1ST ENC: CPT | Mod: 58

## 2020-06-22 NOTE — HISTORY OF PRESENT ILLNESS
[de-identified] : Removal of hardware left ankle.\par DOS 6/12/2020 [de-identified] : Jorge A is 10 days out from his left ankle removal of hardware. He has no complaints at this time. The patient continues with antibiotic therapy as well as Lovenox for DVT prophylaxis. His pain scale today is a 4/10. He is nonweightbearing using crutches. He denies fevers, chills, night sweats, shortness of breath, numbness and tingling. He has no other complaints. [de-identified] : Left Ankle Physical Examination:\par \par General: Alert and oriented x3.  In no acute distress.  Pleasant in nature with a normal affect.  No apparent respiratory distress. \par Erythema, Warmth, Rubor: Negative\par Swelling: Positive swelling present\par \par *Sutures intact. No wound dehiscence, drainage, signs of infection.\par \par ROM:\par 1. Dorsiflexion: 0 degrees\par 2. Plantarflexion: 20 degrees\par 3. Inversion: 10 degrees\par 4. Eversion: 10 degrees\par \par Tenderness to Palpation: \par 1. Lateral Malleolus: Negative\par 2. Medial Malleolus: Negative\par 3. Proximal Fibular Pain: Negative\par 4. Heel Pain: Negative\par 5. Cuboid: Negative\par 6. Navicular: Negative\par 7. Tibiotalar Joint: Negative\par 8. Subtalar Joint: Negative\par 9. Posterior Recess: Negative\par \par Tendon Pain:\par 1. Achilles: Negative\par 2. Peroneals: Negative\par 3. Posterior Tibialis: Negative\par 4. Tibialis Anterior: Negative\par \par Ligament Pain:\par 1. ATFL: Negative\par 2. CFL: Negative \par 3. PTFL: Negative\par 4. Deltoid Ligaments: Negative\par 5. Lis Franc Ligament: Negative\par \par Stability: \par 1. Anterior Drawer: Negative\par 2. Posterior Drawer: Negative\par \par Strength: 5/5 TA/GS/EHL\par \par Pulses: 2+ DP/PT Pulses\par \par Neuro: Intact motor and sensory\par \par Additional Test:\par 1. Calcaneal Squeeze Test: Negative\par 2. Syndesmosis Squeeze Test: Negative\par  [de-identified] : X-rays of the left ankle taken in office today and reviewed with the patient showed: Hardware is out. Medial malleoli absent. [de-identified] : Removal of hardware left ankle.\par DOS 6/12/2020 [de-identified] : At this point in time she will continue to wear the boot, nonweightbearing. She will continue with Lovenox and his antibiotic. The patient will followup next week for suture removal and wound check. All of his questions were answered at this time that the ankle and the procedure.\par \par Wishes to proceed with ankle fusion\par Discussed ankle medial mall allograft reconstruction\par F/U 1 week\par DVT ppx via lovenox\par Transition to  BID when done with injections.\par NWB

## 2020-06-30 ENCOUNTER — APPOINTMENT (OUTPATIENT)
Dept: ORTHOPEDIC SURGERY | Facility: CLINIC | Age: 45
End: 2020-06-30
Payer: COMMERCIAL

## 2020-06-30 PROCEDURE — 99024 POSTOP FOLLOW-UP VISIT: CPT

## 2020-06-30 NOTE — HISTORY OF PRESENT ILLNESS
[de-identified] : Jorge A is 18 days out from his left ankle removal of hardware. He has no complaints at this time. The patient finished antibiotic therapy.  The patient is on Lovenox for DVT prophylaxis. His pain scale today is a 2/10. He is nonweightbearing using crutches. He denies fevers, chills, night sweats, shortness of breath, numbness and tingling. He has no other complaints. [de-identified] : Removal of hardware left ankle.\par DOS 6/12/2020 [de-identified] : Removal of hardware left ankle.\par DOS 6/12/2020 [de-identified] : X-rays of the left ankle taken in office today and reviewed with the patient showed: Hardware is out. Medial malleoli absent. [de-identified] : Left Ankle Physical Examination:\par \par General: Alert and oriented x3.  In no acute distress.  Pleasant in nature with a normal affect.  No apparent respiratory distress. \par Erythema, Warmth, Rubor: Negative\par Swelling: Minimal swelling present\par \par *Sutures removed.  Incision c/d/i and healed. \par \par ROM:\par 1. Dorsiflexion: 0 degrees\par 2. Plantarflexion: 20 degrees\par 3. Inversion: 10 degrees\par 4. Eversion: 10 degrees\par \par Tenderness to Palpation: \par 1. Lateral Malleolus: Negative\par 2. Medial Malleolus: Negative\par 3. Proximal Fibular Pain: Negative\par 4. Heel Pain: Negative\par 5. Cuboid: Negative\par 6. Navicular: Negative\par 7. Tibiotalar Joint: Negative\par 8. Subtalar Joint: Negative\par 9. Posterior Recess: Negative\par \par Tendon Pain:\par 1. Achilles: Negative\par 2. Peroneals: Negative\par 3. Posterior Tibialis: Negative\par 4. Tibialis Anterior: Negative\par \par Ligament Pain:\par 1. ATFL: Negative\par 2. CFL: Negative \par 3. PTFL: Negative\par 4. Deltoid Ligaments: Negative\par 5. Lis Franc Ligament: Negative\par \par Stability: \par 1. Anterior Drawer: Negative\par 2. Posterior Drawer: Negative\par \par Strength: 5/5 TA/GS/EHL\par \par Pulses: 2+ DP/PT Pulses\par \par Neuro: Intact motor and sensory\par \par Additional Test:\par 1. Calcaneal Squeeze Test: Negative\par 2. Syndesmosis Squeeze Test: Negative\par  [de-identified] : At this point in time he will continue to wear the boot, nonweightbearing. He will continue with  mg BID.  The patient will continue NWB.  All of his questions were answered at this time that the ankle and the procedure.\par \par Wishes to proceed with ankle fusion\par Discussed ankle medial mall allograft reconstruction\par DVT ppx via  mg BID\par NWB\par \par Plan to move forward with planned fusion\par NWB\par DVT\par All RBA discussed.

## 2020-07-15 RX ORDER — ASPIRIN 325 MG/1
325 TABLET ORAL
Qty: 60 | Refills: 0 | Status: ACTIVE | COMMUNITY
Start: 2020-05-22 | End: 1900-01-01

## 2020-07-21 ENCOUNTER — OUTPATIENT (OUTPATIENT)
Dept: OUTPATIENT SERVICES | Facility: HOSPITAL | Age: 45
LOS: 1 days | End: 2020-07-21
Payer: COMMERCIAL

## 2020-07-21 DIAGNOSIS — Z11.59 ENCOUNTER FOR SCREENING FOR OTHER VIRAL DISEASES: ICD-10-CM

## 2020-07-21 DIAGNOSIS — Z98.890 OTHER SPECIFIED POSTPROCEDURAL STATES: Chronic | ICD-10-CM

## 2020-07-21 PROCEDURE — U0003: CPT

## 2020-07-22 DIAGNOSIS — Z11.59 ENCOUNTER FOR SCREENING FOR OTHER VIRAL DISEASES: ICD-10-CM

## 2020-07-22 LAB — SARS-COV-2 RNA SPEC QL NAA+PROBE: SIGNIFICANT CHANGE UP

## 2020-07-24 ENCOUNTER — RESULT REVIEW (OUTPATIENT)
Age: 45
End: 2020-07-24

## 2020-07-24 ENCOUNTER — APPOINTMENT (OUTPATIENT)
Dept: ORTHOPEDIC SURGERY | Facility: HOSPITAL | Age: 45
End: 2020-07-24

## 2020-07-24 ENCOUNTER — OUTPATIENT (OUTPATIENT)
Dept: INPATIENT UNIT | Facility: HOSPITAL | Age: 45
LOS: 1 days | Discharge: HOME | End: 2020-07-24
Payer: COMMERCIAL

## 2020-07-24 VITALS
DIASTOLIC BLOOD PRESSURE: 88 MMHG | TEMPERATURE: 98 F | OXYGEN SATURATION: 97 % | SYSTOLIC BLOOD PRESSURE: 142 MMHG | RESPIRATION RATE: 16 BRPM | HEART RATE: 84 BPM

## 2020-07-24 VITALS
RESPIRATION RATE: 16 BRPM | HEIGHT: 68 IN | OXYGEN SATURATION: 100 % | DIASTOLIC BLOOD PRESSURE: 89 MMHG | HEART RATE: 80 BPM | TEMPERATURE: 98 F | WEIGHT: 210.1 LBS | SYSTOLIC BLOOD PRESSURE: 136 MMHG

## 2020-07-24 DIAGNOSIS — S82.892A OTHER FRACTURE OF LEFT LOWER LEG, INITIAL ENCOUNTER FOR CLOSED FRACTURE: ICD-10-CM

## 2020-07-24 DIAGNOSIS — Z98.890 OTHER SPECIFIED POSTPROCEDURAL STATES: Chronic | ICD-10-CM

## 2020-07-24 DIAGNOSIS — Z98.890 OTHER SPECIFIED POSTPROCEDURAL STATES: ICD-10-CM
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
## 2020-07-24 PROCEDURE — 76000 FLUOROSCOPY <1 HR PHYS/QHP: CPT

## 2020-07-24 PROCEDURE — 27870 FUSION OF ANKLE JOINT OPEN: CPT | Mod: 58,LT

## 2020-07-24 PROCEDURE — C1713: CPT

## 2020-07-24 PROCEDURE — C1889: CPT

## 2020-07-24 PROCEDURE — 99213 OFFICE O/P EST LOW 20 MIN: CPT

## 2020-07-24 PROCEDURE — C1734: CPT

## 2020-07-24 PROCEDURE — 20902 REMOVAL OF BONE FOR GRAFT: CPT | Mod: 58,LT

## 2020-07-24 RX ORDER — ASPIRIN/CALCIUM CARB/MAGNESIUM 324 MG
1 TABLET ORAL
Qty: 28 | Refills: 0
Start: 2020-07-24 | End: 2020-08-20

## 2020-07-24 RX ORDER — ENOXAPARIN SODIUM 100 MG/ML
40 INJECTION SUBCUTANEOUS DAILY
Refills: 0 | Status: DISCONTINUED | OUTPATIENT
Start: 2020-07-24 | End: 2020-07-24

## 2020-07-24 RX ORDER — OXYCODONE HYDROCHLORIDE 5 MG/1
5 TABLET ORAL ONCE
Refills: 0 | Status: DISCONTINUED | OUTPATIENT
Start: 2020-07-24 | End: 2020-07-24

## 2020-07-24 RX ORDER — ENOXAPARIN SODIUM 100 MG/ML
40 INJECTION SUBCUTANEOUS
Qty: 560 | Refills: 1
Start: 2020-07-24 | End: 2020-08-20

## 2020-07-24 RX ORDER — AMLODIPINE BESYLATE 2.5 MG/1
5 TABLET ORAL DAILY
Refills: 0 | Status: DISCONTINUED | OUTPATIENT
Start: 2020-07-24 | End: 2020-07-24

## 2020-07-24 RX ORDER — ASPIRIN/CALCIUM CARB/MAGNESIUM 324 MG
1 TABLET ORAL
Qty: 0 | Refills: 0 | DISCHARGE

## 2020-07-24 RX ORDER — AMLODIPINE BESYLATE 2.5 MG/1
1 TABLET ORAL
Qty: 0 | Refills: 0 | DISCHARGE

## 2020-07-24 RX ORDER — FENTANYL CITRATE 50 UG/ML
50 INJECTION INTRAVENOUS
Refills: 0 | Status: DISCONTINUED | OUTPATIENT
Start: 2020-07-24 | End: 2020-07-24

## 2020-07-24 RX ORDER — ONDANSETRON 8 MG/1
1 TABLET, FILM COATED ORAL
Qty: 56 | Refills: 0
Start: 2020-07-24 | End: 2020-08-06

## 2020-07-24 RX ORDER — DOCUSATE SODIUM 100 MG
1 CAPSULE ORAL
Qty: 21 | Refills: 0
Start: 2020-07-24 | End: 2020-07-30

## 2020-07-24 RX ORDER — ONDANSETRON 8 MG/1
4 TABLET, FILM COATED ORAL EVERY 6 HOURS
Refills: 0 | Status: DISCONTINUED | OUTPATIENT
Start: 2020-07-24 | End: 2020-07-24

## 2020-07-24 RX ORDER — SODIUM CHLORIDE 9 MG/ML
1000 INJECTION, SOLUTION INTRAVENOUS
Refills: 0 | Status: DISCONTINUED | OUTPATIENT
Start: 2020-07-24 | End: 2020-07-24

## 2020-07-24 RX ORDER — ACETAMINOPHEN 500 MG
1000 TABLET ORAL ONCE
Refills: 0 | Status: COMPLETED | OUTPATIENT
Start: 2020-07-24 | End: 2020-07-24

## 2020-07-24 RX ADMIN — SODIUM CHLORIDE 75 MILLILITER(S): 9 INJECTION, SOLUTION INTRAVENOUS at 10:55

## 2020-07-24 RX ADMIN — OXYCODONE HYDROCHLORIDE 5 MILLIGRAM(S): 5 TABLET ORAL at 10:55

## 2020-07-24 RX ADMIN — Medication 400 MILLIGRAM(S): at 11:01

## 2020-07-24 RX ADMIN — FENTANYL CITRATE 50 MICROGRAM(S): 50 INJECTION INTRAVENOUS at 10:56

## 2020-07-24 RX ADMIN — FENTANYL CITRATE 50 MICROGRAM(S): 50 INJECTION INTRAVENOUS at 11:00

## 2020-07-24 RX ADMIN — FENTANYL CITRATE 50 MICROGRAM(S): 50 INJECTION INTRAVENOUS at 11:03

## 2020-07-24 RX ADMIN — OXYCODONE HYDROCHLORIDE 5 MILLIGRAM(S): 5 TABLET ORAL at 10:56

## 2020-07-24 RX ADMIN — Medication 1000 MILLIGRAM(S): at 11:01

## 2020-07-24 RX ADMIN — FENTANYL CITRATE 50 MICROGRAM(S): 50 INJECTION INTRAVENOUS at 10:50

## 2020-07-24 RX ADMIN — ENOXAPARIN SODIUM 40 MILLIGRAM(S): 100 INJECTION SUBCUTANEOUS at 13:20

## 2020-07-24 NOTE — ASU PATIENT PROFILE, ADULT - CENTRAL VENOUS CATHETER
Care plan reviewed with patient and he verbalized understanding of the plan of care and contributed   Problem: Musculor/Skeletal Functional Status  Goal: Absence of falls  Outcome: Met This Shift  Note:   No falls this admission   Intervention: Fall precautions  Note:   Patient aware of fall precautions for here and at home -call light in reach while here       Problem: Intellectual/Education/Knowledge Deficit  Goal: Teaching initiated upon admission  Outcome: Met This Shift  Note:   Reviewed sandostatin lar with patient, patient offered no questions or concerns. Patient verbalized understanding of drug being administered. Goal: Written Disposition Instruction form completed  Outcome: Met This Shift  Note:   Discharge instructions given and reviewed with patient. All questions answered. Patient verbalized understanding   Intervention: Verbal/written education provided  Note:   Discharge instruction ruthy     Problem: Discharge Planning  Goal: Knowledge of discharge instructions  Description  Knowledge of discharge instructions     Outcome: Met This Shift  Note:   Patient  able to teach back follow up appointments and when to call the doctor. Patient offers no questions at this time   Intervention: Interaction with patient/family and care team  Note:   Patient and family currently denies any needs or concerns    Intervention: Discharge to appropriate level of care  Note:   Discharge home   to goal setting. no

## 2020-07-24 NOTE — BRIEF OPERATIVE NOTE - NSICDXBRIEFPROCEDURE_GEN_ALL_CORE_FT
PROCEDURES:  Aspiration, bone marrow 24-Jul-2020 10:55:31 Left proximal tibia Cally Chen  Ankle fusion, left 24-Jul-2020 10:55:12  Cally Chen

## 2020-07-24 NOTE — ASU DISCHARGE PLAN (ADULT/PEDIATRIC) - ASU DC SPECIAL INSTRUCTIONSFT
Post-Operative Instructions    Usual Medications:     Antibiotic: Keflex 500mg TID x 7d    Narcotic Pain Med: Percocet 5/325    Blood Clot PRevention: Ecotrin 325 QD x 30d    Anti-nausea: Zofran 4mf PO q6h x 14d    Constipation: Miralax OTC    PRESCRIPTIONS: your post-operative medications have been handed to you or sent to the pharmacy you indicated at your pre-operative visit.  If you have any difficulty obtaining your post-operative medications or have any questions, please call the office at [phone].      PAIN MANAGEMENT: You should expect to have discomfort for the first week or so after surgery. Pain medication should be taken to help alleviate the pain so that you are comfortable and can participate in physical therapy.  Take the medication as directed.  You may decrease the amount of pain medication, as tolerated, when pain improves.  You must exercise caution when operating a motor vehicle. You have been prescribed one or more of the following as indicated on your Med Rec form that the Nurse will go over with you:  [  ] Oxycodone 5mg 1-2 tablets by mouth every 4 to 6 hours as needed for pain  Oxycodone is a short-acting pain medication routinely prescribed after surgery.  It is the pain medication found in “Percocet,” which is a combination of oxycodone and Tylenol.  If you were prescribed oxycodone, you may take Tylenol in addition to this medication, if needed for pain control.  [  ] Oxycontin 10mg by mouth every 12 hours for 5 days  Oxycontin is a long-acting pain medication.  It is sometimes prescribed after longer surgeries. If you were prescribed this medication, you should take it twice a day for the first 5 days after surgery to help with pain control.  [  ] Vicodin or Norco (Hydrocodone 5mg/Tylenol 325mg) 1-2 tablets by mouth every 4-6 hours as needed for pain  Vicodin and Norco are short acting pain medications sometimes prescribed after surgery.  If you were prescribed this medication, you may take 1-2 tablets every 4-6 hours as needed for pain.  This medication already contains Tylenol.  You should NOT take any additional Tylenol (acetaminophen) if you are taking these medications.    NAUSEA: Nausea is a common side effect of anesthesia and pain medications.  You may take the below medication if you are experiencing nausea after surgery.  If you continue to experience nausea or vomiting more than 24 hours after surgery, please call the office.  [  ] Ondansetron 4mg by mouth every 4 hours as needed for nausea    CONSTIPATION: common side effect of anesthesia and pain medications.  You should take Colace three times daily, as long as you are taking narcotic pain medications after surgery, such as oxycodone, oxycontin, vicodin, or norco.  [  ] Colace 100mg by mouth three times daily    DVT PROPHYLAXIS (PREVENTION OF BLOOD CLOTS): Aspirin EC can reduce the risk of blood clots after surgery, particularly after surgery on the legs, ankles and feet.  If you have been prescribed Aspirn, it is essential that you take this medication.  If you already are on an anticoagulant (blood thinner) such as Xarelto, Coumadin, Warfarin, Eliquis - you should resume you home "blood-thinner" in place of the Aspirn.  [  ] Aspirin 325mg ENTERIC COATED (EC) by mouth once daily for 2-4 weeks (depending on surgical procedure)    ACTIVITY: You should be up and moving as much and as often as possible! Do NOT walk or put bodyweight on your splint or surgical side. Use Crutches or walker. You must keep your bandage/splint dry. Do NOT get it wet. IF you shower it MUST be covered tightly with a garbage bag. Otherwise sponge bath is advised. You do NOT want wet bandages on your skin as they can cause skin breakdown under the splint.    BANDAGE: Your bandage will be chaged in the office. Do NOT remove it yourself. IF it gets damaged or wet (soaked) call. Follow up about 7-10 days in office or as otherwise advised by Dr. Austin if different. Post-Operative Instructions    Usual Medications:     Narcotic Pain Med: Percocet 5/325    Blood Clot Prevention: Ecotrin 325 QD x 30d    Anti-nausea: Zofran 4mf PO q6h x 14d    Constipation: Miralax OTC    PRESCRIPTIONS: your post-operative medications have been handed to you or sent to the pharmacy you indicated at your pre-operative visit.  If you have any difficulty obtaining your post-operative medications or have any questions, please call the office at [phone].      PAIN MANAGEMENT: You should expect to have discomfort for the first week or so after surgery. Pain medication should be taken to help alleviate the pain so that you are comfortable and can participate in physical therapy.  Take the medication as directed.  You may decrease the amount of pain medication, as tolerated, when pain improves.  You must exercise caution when operating a motor vehicle. You have been prescribed one or more of the following as indicated on your Med Rec form that the Nurse will go over with you:  [  ] Vicodin or Norco (Hydrocodone 5mg/Tylenol 325mg) 1-2 tablets by mouth every 4-6 hours as needed for pain  Vicodin and Norco are short acting pain medications sometimes prescribed after surgery.  If you were prescribed this medication, you may take 1-2 tablets every 4-6 hours as needed for pain.  This medication already contains Tylenol.  You should NOT take any additional Tylenol (acetaminophen) if you are taking these medications.    NAUSEA: Nausea is a common side effect of anesthesia and pain medications.  You may take the below medication if you are experiencing nausea after surgery.  If you continue to experience nausea or vomiting more than 24 hours after surgery, please call the office.  [  ] Ondansetron 4mg by mouth every 4 hours as needed for nausea    CONSTIPATION: common side effect of anesthesia and pain medications.  You should take Colace three times daily, as long as you are taking narcotic pain medications after surgery, such as oxycodone, oxycontin, vicodin, or norco.  [  ] Colace 100mg by mouth three times daily    DVT PROPHYLAXIS (PREVENTION OF BLOOD CLOTS): Aspirin EC can reduce the risk of blood clots after surgery, particularly after surgery on the legs, ankles and feet.  If you have been prescribed Aspirn, it is essential that you take this medication.  If you already are on an anticoagulant (blood thinner) such as Xarelto, Coumadin, Warfarin, Eliquis - you should resume you home "blood-thinner" in place of the Aspirn.  [  ] Aspirin 325mg ENTERIC COATED (EC) by mouth once daily for 2-4 weeks (depending on surgical procedure)    ACTIVITY: You should be up and moving as much and as often as possible! Do NOT walk or put bodyweight on your splint or surgical side. Use Crutches or walker. You must keep your bandage/splint dry. Do NOT get it wet. IF you shower it MUST be covered tightly with a garbage bag. Otherwise sponge bath is advised. You do NOT want wet bandages on your skin as they can cause skin breakdown under the splint.    BANDAGE: Your bandage will be chaged in the office. Do NOT remove it yourself. IF it gets damaged or wet (soaked) call. Follow up about 7-10 days in office or as otherwise advised by Dr. Austin if different.

## 2020-07-24 NOTE — ASU DISCHARGE PLAN (ADULT/PEDIATRIC) - NURSING INSTRUCTIONS
Begin with liquids and light food ( tea, toast, Jello, soups). Advance to what you normally eat. Liquids should taken in adequate amounts today.Refer to multi color post op discharge instruction sheet     CALL the DOCTOR:    -Fever greater than  101F  - Signs  of infection such as : increase pain,swelling,redness,or a bad  smell coming from the wound.  -Excessive amount of bleeding.  - Any pain that appears to be getting worse.  - Vomiting  -  If you have  not urinated 8 hours after surgery or have any difficulty urinating.     A responsible adult should be with you for the rest of the day and night for your safety and to help you if you needed.    Review attached FACT SHEET if applicable. Review home care instructions For any problems or concerns,contact your doctor. [facility] patients should call the [facility]. If you cannot reach the doctor or clinic, call [facility] Emergency Department at [phone] or go to your local Emergency Department.  A responsible adult should be with you for the rest of the day and night for your safety and to help you if you needed. Resume your medications as listed on the attached Medication Record.

## 2020-07-24 NOTE — CONSULT NOTE ADULT - SUBJECTIVE AND OBJECTIVE BOX
HPI:  Patient is a 44y old male who presents with a chief complaint of left ankle pain now s/p left ankle fusion.    Consulted by Dr. Austin for VTE prophylaxis, risk stratification, and anticoagulation management.    PAST MEDICAL & SURGICAL HISTORY:  Hypertension  Asthma  History of ankle surgery    Interval History:  :: Patient seen at bedside in PACU preparing to go home soon. Explained risk for VTE due to non weightbearing status with patient. He is amenable to Lovenox injections. He had done them recently for ankle surgery in . Will likely to be switched to ASA sooner than later as long as he is active and ambulating. Explained will follow up as outpatient with labs and to assess ambulatory status. Script sent to pharmacy.    BMI: 32.7    CrCl:    Caprini VTE Risk Score:  CAPRINI SCORE  AGE RELATED RISK FACTORS                                                       MOBILITY RELATED FACTORS  [x ] Age 41-60 years                                            (1 Point)                  [ ] Bed rest                                                        (1 Point)  [ ] Age: 61-74 years                                           (2 Points)                [x ] Plaster cast                                                   (2 Points)  [ ] Age= 75 years                                              (3 Points)                 [ ] Bed bound for more than 72 hours                   (2 Points)    DISEASE RELATED RISK FACTORS                                               GENDER SPECIFIC FACTORS  [ ] Edema in the lower extremities                       (1 Point)           [ ] Pregnancy                                                            (1 Point)  [ ] Varicose veins                                               (1 Point)                  [ ] Post-partum < 6 weeks                                      (1 Point)             [x ] BMI > 25 Kg/m2                                            (1 Point)                  [ ] Hormonal therapy or oral contraception       (1 Point)                 [ ] Sepsis (in the previous month)                        (1 Point)             [ ] History of pregnancy complications                (1Point)  [ ] Pneumonia or serious lung disease                                             [ ] Unexplained or recurrent  (=/>3), premature                                 (In the previous month)                               (1 Point)                birth with toxemia or growth-restricted infant (1 Point)  [ ] Abnormal pulmonary function test            (1 Point)                                   SURGERY RELATED RISK FACTORS  [ ] Acute myocardial infarction                       (1 Point)                  [ ]  Section                                         (1 Point)  [ ] Congestive heart failure (in the previous month) (1 Point)   [ ] Minor surgery   lasting <45 minutes       (1 Point)   [ ] Inflammatory bowel disease                             (1 Point)          [ ] Arthroscopic surgery                                  (2 Points)  [ ] Central venous access                                    (2 Points)            [x ] General surgery lasting >45 minutes      (2 Points)       [ ] Stroke (in the previous month)                  (5 Points)            [ ] Elective major lower extremity arthroplasty (5 Points)                                   [  ] Malignancy (present or past include skin melanoma                                          but exclude  basal skin cell)    (2 points)                                      TRAUMA RELATED RISK FACTORS                HEMATOLOGY RELATED FACTORS                                  [ ] Fracture of the hip, pelvis, or leg                       (5 Points)  [ ] Prior episodes of VTE                                     (3 Points)          [ ] Acute spinal cord injury (in the previous month)  (5 Points)  [ ] Positive family history for VTE                         (3 Points)       [ ] Paralysis (less than 1 month)                          (5 Points)  [ ] Prothrombin 48135 A                                      (3 Points)         [ ] Multiple Trauma (within 1month)                 (5Points)                                                                                                                                                                [ ] Factor V Leiden                                          (3 Points)                                OTHER RISK FACTORS                          [ ] Lupus anticoagulants                                     (3 Points)                       [ ] BMI > 40                          (1 Point)                                                         [ ] Anticardiolipin antibodies                                (3 Points)                   [ ] Smoking                              (1Point)                                                [ ] High homocysteine in the blood                      (3 Points)                [  ] Diabetes requiring insulin (1point)                         [ ] Other congenital or acquired thrombophilia       (3 Points)          [  ] Chemotherapy                   (1 Point)  [ ] Heparin induced thrombocytopenia                  (3 Points)             [  ] Blood Transfusion                (1 point)                                                                                                             Total Score [     6     ]                                                                                                                                                                                                                                                                                                                                                                                                                                           IMPROVE Bleeding Risk Score:2.5      Falls Risk:   High (  )  Mod ( x )  Low (  )      FAMILY HISTORY:    Denies any personal or familial history of clotting or bleeding disorders.    Allergies    ibuprofen (Unknown)  Ibuprofen IB (Other)    Intolerances        REVIEW OF SYSTEMS    (  )Fever	     (  )Constipation	(  )SOB				(  )Headache	(  )Dysuria  (  )Chills	     (  )Melena	(  )Dyspnea present on exertion (  )Dizziness   (  )Polyuria  (  )Nausea	     (  )Hematochezia	(  )Cough         (  )Syncope   	         (  )Hematuria  (  )Vomiting    (  )Chest Pain	(  )Wheezing			(  )Weakness  (  )Diarrhea     (  )Palpitations	(  )Anorexia			( x )Myalgia    Pertinent positives in HPI and daily subjective. All other systems negative.      PHYSICAL EXAM:    Constitutional: Appears Well    Neurological: A& O x 3    Skin: Warm    Respiratory and Thorax: normal effort; Breath sounds: normal; No rales/wheezing/rhonchi  	  Cardiovascular: S1, S2, regular, NMBR	    Gastrointestinal: BS + x 4Q, nontender	    Musculoskeletal:   General Right:   no muscle/joint tenderness,   normal tone, no joint swelling,   ROM: full	    General Left:   no muscle/joint tenderness,   normal tone, no joint swelling,   ROM: limited    LLE: plaster cast with ace    Lower extrems:   Right: no calf tenderness              negative cande's sign               + pedal pulses    Left:   no calf tenderness              negative cande's sign               + pedal pulses        			    MEDICATIONS  (STANDING):  enoxaparin Injectable 40 milliGRAM(s) SubCutaneous daily  lactated ringers. 1000 milliLiter(s) IV Continuous <Continuous>          Vital Signs Last 24 Hrs  T(C): 36.6 (20 @ 12:06), Max: 36.8 (20 @ 06:28)  T(F): 97.8 (20 @ 12:06), Max: 98.3 (20 @ 06:28)  HR: 88 (20 @ 12:06) (80 - 99)  BP: 125/70 (20 @ 12:06) (125/70 - 144/88)  BP(mean): --  RR: 14 (20 @ 12:06) (14 - 18)  SpO2: 98% (20 @ 12:06) (97% - 100%)      **Current DVT Prophylaxis:    LMWH                   ( x )  Heparin SQ           (  )  Coumadin             (  )  Xarelto                  (  )  Eliquis                   (  )  Venodynes           ( x )  Ambulation          (x  )  UFH                       (  )  ECASA                   (  )  Contraindicated  (  )

## 2020-07-24 NOTE — CONSULT NOTE ADULT - ASSESSMENT
This is a 44 year old male s/p left ankle fusion, and bone marrow biopsy by Dr. Austin with moderate risk for VTE due to BMI, age, mobility status, and plaster cast. He is low risk for bleeding.    Recommend Lovenox 40 mg Sq daily for at least the first two weeks post operatively. He is relatively young and healthy and if he is ambulating and not sedentary after that two week period, will likely switch over to ASA.    Plan:  ::Lovenox 40mg SQ daily x 14 days then reevaluate if switch to ASA  ::Lab at home- CBC BMP next week  ::Script sent to pharmacy  :;Enc ambulation at home    Will follow up as outpatient via phone next week.  Dispo: Home today

## 2020-07-24 NOTE — ASU DISCHARGE PLAN (ADULT/PEDIATRIC) - CALL YOUR DOCTOR IF YOU HAVE ANY OF THE FOLLOWING:
Numbness, tingling, color or temperature change to extremity/Fever greater than (need to indicate Fahrenheit or Celsius)/Bleeding that does not stop/Wound/Surgical Site with redness, or foul smelling discharge or pus

## 2020-07-30 ENCOUNTER — LABORATORY RESULT (OUTPATIENT)
Age: 45
End: 2020-07-30

## 2020-07-30 DIAGNOSIS — Z87.81 PERSONAL HISTORY OF (HEALED) TRAUMATIC FRACTURE: ICD-10-CM

## 2020-07-30 DIAGNOSIS — I10 ESSENTIAL (PRIMARY) HYPERTENSION: ICD-10-CM

## 2020-07-30 DIAGNOSIS — M19.172 POST-TRAUMATIC OSTEOARTHRITIS, LEFT ANKLE AND FOOT: ICD-10-CM

## 2020-07-30 DIAGNOSIS — F17.210 NICOTINE DEPENDENCE, CIGARETTES, UNCOMPLICATED: ICD-10-CM

## 2020-07-30 DIAGNOSIS — J45.909 UNSPECIFIED ASTHMA, UNCOMPLICATED: ICD-10-CM

## 2020-08-03 ENCOUNTER — APPOINTMENT (OUTPATIENT)
Dept: ORTHOPEDIC SURGERY | Facility: CLINIC | Age: 45
End: 2020-08-03
Payer: COMMERCIAL

## 2020-08-03 PROCEDURE — 99024 POSTOP FOLLOW-UP VISIT: CPT

## 2020-08-03 PROCEDURE — 73610 X-RAY EXAM OF ANKLE: CPT | Mod: LT

## 2020-08-03 PROCEDURE — 29405 APPL SHORT LEG CAST: CPT | Mod: 58,LT

## 2020-08-03 RX ORDER — CIPROFLOXACIN HYDROCHLORIDE 500 MG/1
500 TABLET, FILM COATED ORAL
Qty: 28 | Refills: 0 | Status: ACTIVE | COMMUNITY
Start: 2020-06-12

## 2020-08-03 RX ORDER — DOCUSATE SODIUM 100 MG/1
100 CAPSULE, LIQUID FILLED ORAL
Qty: 21 | Refills: 0 | Status: ACTIVE | COMMUNITY
Start: 2020-07-24

## 2020-08-03 RX ORDER — CEFUROXIME AXETIL 500 MG/1
500 TABLET ORAL
Qty: 28 | Refills: 0 | Status: ACTIVE | COMMUNITY
Start: 2020-06-12

## 2020-08-03 RX ORDER — ONDANSETRON 4 MG/1
4 TABLET ORAL
Qty: 12 | Refills: 0 | Status: ACTIVE | COMMUNITY
Start: 2020-07-24

## 2020-08-03 RX ORDER — ENOXAPARIN SODIUM 100 MG/ML
40 INJECTION SUBCUTANEOUS
Qty: 6 | Refills: 0 | Status: ACTIVE | COMMUNITY
Start: 2020-07-24

## 2020-08-03 NOTE — HISTORY OF PRESENT ILLNESS
[___ Days Post Op] : post op day #[unfilled] [Healed] : healed [Clean/Dry/Intact] : clean, dry and intact [Neuro Intact] : an unremarkable neurological exam [Negative Ramila's] : maneuvers demonstrated a negative Ramila's sign [Vascular Intact] : ~T peripheral vascular exam normal [Excellent Pain Control] : has excellent pain control [Doing Well] : is doing well [No Sign of Infection] : is showing no signs of infection [Chills] : no chills [Fever] : no fever [Nausea] : no nausea [Discharge] : absent of discharge [Erythema] : not erythematous [Vomiting] : no vomiting [Swelling] : not swollen [Dehiscence] : not dehisced [de-identified] : s/p Left ankle arthrodesis, tibiotalar fusion, proximal tibia bone marrow aspirate greater than 10 mL. \par DOS 7/24/2020 [de-identified] : General: Alert and oriented x3. In no acute distress. Pleasant in nature with a normal affect. No apparent respiratory distress.\par The wound is intact. No evidence of any diastases or dehiscence. No surrounding erythema noted. No fluctuance. The area is warm and well perfused. The patient is able to wiggle their toes. Neurovascularly intact.  [de-identified] : 44 year old male present in the office 10 days post op from Left ankle arthrodesis, tibiotalar fusion, proximal tibia bone marrow aspirate greater than 10 mL. The patient's pain is noted to be a 5/10. The patient presents ambulating in crutches. He is not currently taking any pain medication. He is taking Lovenox. No other complaints at this time.  [de-identified] : 3V of the left ankle were ordered obtained and reviewed by me today, 08/03/2020 , revealed: Hardware in good position [de-identified] : Patient is a 44 year old male present in the office today 10 days s/p Left ankle arthrodesis, tibiotalar fusion, proximal tibia bone marrow aspirate greater than 10 mL. The wound was cleaned in the office today and a new dressing was applied. I recommend that the patient be fitted for a cast. The patient was fitted for the cast in the office today. He should be non weight bearing until further notice. If the patient notices any loosening of the cast, they should call the office as soon as possible.  I would like to see the patient back in the office in 10 days to reassess their condition. I have addressed all the patient's concerns surrounding the pathology of their condition. The patient understood and verbally agreed to the treatment plan. All of their questions were answered and they were satisfied with the visit. The patient should call the office if they have any questions or experience worsening symptoms.

## 2020-08-03 NOTE — ADDENDUM
[FreeTextEntry1] : I, Sree Quezada, acted solely as a scribe for Dr. Gibran Austin on this date 08/03/2020 .\par All medical record entries made by the Scribe were at my, Dr. Gibran Austin, direction and personally dictated by me on 08/03/2020 . I have reviewed the chart and agree that the record accurately reflects my personal performance of the history, physical exam, assessment and plan. I have also personally directed, reviewed, and agreed with the chart.

## 2020-08-13 ENCOUNTER — APPOINTMENT (OUTPATIENT)
Dept: ORTHOPEDIC SURGERY | Facility: CLINIC | Age: 45
End: 2020-08-13
Payer: COMMERCIAL

## 2020-08-13 PROCEDURE — 29405 APPL SHORT LEG CAST: CPT | Mod: 58,LT

## 2020-08-13 PROCEDURE — 73610 X-RAY EXAM OF ANKLE: CPT | Mod: LT

## 2020-08-13 PROCEDURE — 99024 POSTOP FOLLOW-UP VISIT: CPT

## 2020-08-14 NOTE — HISTORY OF PRESENT ILLNESS
[___ Weeks Post Op] : [unfilled] weeks post op [Clean/Dry/Intact] : clean, dry and intact [Healed] : healed [Neuro Intact] : an unremarkable neurological exam [Vascular Intact] : ~T peripheral vascular exam normal [Negative Ramila's] : maneuvers demonstrated a negative Ramila's sign [Excellent Pain Control] : has excellent pain control [Doing Well] : is doing well [No Sign of Infection] : is showing no signs of infection [Sutures Removed] : sutures were removed [Fever] : no fever [Chills] : no chills [Vomiting] : no vomiting [Nausea] : no nausea [Erythema] : not erythematous [Dehiscence] : not dehisced [Discharge] : absent of discharge [Swelling] : not swollen [de-identified] : s/p Left ankle arthrodesis, tibiotalar fusion, proximal tibia bone marrow aspirate greater than 10 mL. \par DOS 7/24/2020.  [de-identified] : 44 year old male present in the office 3 weeks post op from Left ankle arthrodesis, tibiotalar fusion, proximal tibia bone marrow aspirate greater than 10 mL. The patient's pain is noted to be a 3/10. The pain and swelling are noted to be the same compared to the previous visit. The patient presents ambulating in crutches. He reports some numbness.  He is currently taking Tylenol and aspirin. No other complaints at this time.  [de-identified] : General: Alert and oriented x3. In no acute distress. Pleasant in nature with a normal affect. No apparent respiratory distress.\par The wound is intact. No evidence of any diastases or dehiscence. No surrounding erythema noted. No fluctuance. The area is warm and well perfused. The patient is able to wiggle their toes. Neurovascularly intact.  [de-identified] : Patient is a 44 year old male present in the office today 3 weeks s/p Left ankle arthrodesis, tibiotalar fusion, proximal tibia bone marrow aspirate greater than 10 mL. The cast was removed today and the sutures were removed. I advised the patient to continue taking aspirin for blood thinning purposes. I recommend that the patient be fitted for a cast. The patient was fitted for the cast in the office today. He should be non weight bearing until further notice. If the patient notices any loosening of the cast, they should call the office as soon as possible. I would like to see the patient back in the office in 2 weeks to reassess their condition. I have addressed all the patient's concerns surrounding the pathology of their condition. The patient understood and verbally agreed to the treatment plan. All of their questions were answered and they were satisfied with the visit. The patient should call the office if they have any questions or experience worsening symptoms.  [de-identified] : 3V of the left ankle were ordered obtained and reviewed by me today, 08/13/2020 , revealed: Hardware in good position.

## 2020-08-14 NOTE — ADDENDUM
[FreeTextEntry1] : I, Sree Quezada, acted solely as a scribe for Dr. Gibran Austin on this date 08/13/2020 .\par All medical record entries made by the Scribe were at my, Dr. Gibran Austin, direction and personally dictated by me on 08/13/2020 . I have reviewed the chart and agree that the record accurately reflects my personal performance of the history, physical exam, assessment and plan. I have also personally directed, reviewed, and agreed with the chart.

## 2020-08-17 ENCOUNTER — RX RENEWAL (OUTPATIENT)
Age: 45
End: 2020-08-17

## 2020-08-28 ENCOUNTER — APPOINTMENT (OUTPATIENT)
Dept: ORTHOPEDIC SURGERY | Facility: CLINIC | Age: 45
End: 2020-08-28
Payer: COMMERCIAL

## 2020-08-28 PROCEDURE — 73610 X-RAY EXAM OF ANKLE: CPT | Mod: 26,LT

## 2020-08-28 PROCEDURE — 99024 POSTOP FOLLOW-UP VISIT: CPT

## 2020-08-28 NOTE — HISTORY OF PRESENT ILLNESS
[de-identified] : s/p Left ankle arthrodesis, tibiotalar fusion, proximal tibia bone marrow aspirate greater than 10 mL. \par DOS 7/24/2020.  [de-identified] : The patient presents 5 weeks out from his left ankle arthrodesis. He presents to have his cast removed. He continues with DVT prophylaxis. His pain scale is 0/10. He is nonweightbearing. He denies fevers, chills, night sweats. He denies shortness of breath, numbness and tingling down his leg. No complaints. [de-identified] : X-rays of the left ankle reviewed: No abnormalities seen. Hardware in good position. No change. [de-identified] : Left Ankle Physical Examination:\par \par General: Alert and oriented x3.  In no acute distress.  Pleasant in nature with a normal affect.  No apparent respiratory distress. \par Erythema, Warmth, Rubor: Negative\par Swelling: Mild swelling present\par \par ROM:\par No motion due to the fusion.\par \par Tenderness to Palpation: \par 1. Lateral Malleolus: Negative\par 2. Medial Malleolus: Negative\par 3. Proximal Fibular Pain: Negative\par 4. Heel Pain: Negative\par 5. Cuboid: Negative\par 6. Navicular: Negative\par 7. Tibiotalar Joint: Negative\par 8. Subtalar Joint: Negative\par 9. Posterior Recess: Negative\par \par Tendon Pain:\par 1. Achilles: Negative\par 2. Peroneals: Negative\par 3. Posterior Tibialis: Negative\par 4. Tibialis Anterior: Negative\par \par Ligament Pain:\par 1. ATFL: Negative\par 2. CFL: Negative \par 3. PTFL: Negative\par 4. Deltoid Ligaments: Negative\par 5. Lis Franc Ligament: Negative\par \par Stability: \par 1. Anterior Drawer: Negative\par 2. Posterior Drawer: Negative\par \par Strength: 5/5 TA/GS/EHL\par \par Pulses: 2+ DP/PT Pulses\par \par Neuro: Intact motor and sensory\par \par Additional Test:\par 1. Calcaneal Squeeze Test: Negative\par 2. Syndesmosis Squeeze Test: Negative\par  [de-identified] : s/p Left ankle arthrodesis, tibiotalar fusion, proximal tibia bone marrow aspirate greater than 10 mL. \par DOS 7/24/2020.  [de-identified] : The patient was placed in a long CAM boot. He will continue toe-touch weight bearing for the next 3 weeks. She will continue with DVT prophylaxis. She will continue with ice and elevation for swelling control. The patient will return to office to meet with Dr. Austin in 3 weeks. That will be his 2 month postoperative visit. All of his questions were answered.

## 2020-09-09 ENCOUNTER — RX RENEWAL (OUTPATIENT)
Age: 45
End: 2020-09-09

## 2020-09-09 RX ORDER — ASPIRIN 325 MG/1
325 TABLET, COATED ORAL
Qty: 60 | Refills: 0 | Status: ACTIVE | COMMUNITY
Start: 2020-08-17 | End: 1900-01-01

## 2020-09-16 ENCOUNTER — APPOINTMENT (OUTPATIENT)
Dept: ORTHOPEDIC SURGERY | Facility: CLINIC | Age: 45
End: 2020-09-16
Payer: COMMERCIAL

## 2020-09-16 DIAGNOSIS — M79.672 PAIN IN LEFT FOOT: ICD-10-CM

## 2020-09-16 PROCEDURE — 99024 POSTOP FOLLOW-UP VISIT: CPT

## 2020-09-16 PROCEDURE — 73610 X-RAY EXAM OF ANKLE: CPT | Mod: LT

## 2020-09-16 PROCEDURE — 73630 X-RAY EXAM OF FOOT: CPT | Mod: LT

## 2020-09-16 NOTE — HISTORY OF PRESENT ILLNESS
[___ Weeks Post Op] : [unfilled] weeks post op [Clean/Dry/Intact] : clean, dry and intact [Healed] : healed [Neuro Intact] : an unremarkable neurological exam [Vascular Intact] : ~T peripheral vascular exam normal [Negative Ramila's] : maneuvers demonstrated a negative Ramila's sign [Excellent Pain Control] : has excellent pain control [Doing Well] : is doing well [No Sign of Infection] : is showing no signs of infection [Nausea] : no nausea [Fever] : no fever [Chills] : no chills [Erythema] : not erythematous [Vomiting] : no vomiting [Swelling] : not swollen [Discharge] : absent of discharge [Dehiscence] : not dehisced [de-identified] : General: Alert and oriented x3. In no acute distress. Pleasant in nature with a normal affect. No apparent respiratory distress.\par The wound is intact. No evidence of any diastases or dehiscence. No surrounding erythema noted. No fluctuance. The area is warm and well perfused. The patient is able to wiggle their toes. Neurovascularly intact.  [de-identified] : 44 year old male present in the office 7 weeks post op from Left ankle arthrodesis, tibiotalar fusion, proximal tibia bone marrow aspirate greater than 10 mL. He notes he recently tripped and hurt his left foot and banged his foot against the wall. The patient's pain is noted to be a 0/10. The pain and swelling are noted to be the same compared to the previous visit. The patient presents ambulating in crutches. The patient presents wearing a CAM boot. He is not currently taking any pain medication. No other complaints at this time.  [de-identified] : s/p Left ankle arthrodesis, tibiotalar fusion, proximal tibia bone marrow aspirate greater than 10 mL. \par DOS 7/24/2020. [de-identified] : 3V of the left foot and ankle were ordered obtained and reviewed by me today, 09/16/2020 , revealed: Hardware in good position. No abnormalities.  [de-identified] : Patient is a 44 year old male present in the office today 7 weeks s/p Left ankle arthrodesis, tibiotalar fusion, proximal tibia bone marrow aspirate greater than 10 mL. I advised the patient to continue taking aspirin for blood thinning purposes until he is completely out of the CAM boot. I recommend the patient undergo a course of physical therapy for the left ankle 2-3 times a week for a total of 6-8 weeks. A prescription was given for the physical therapy today. The patient can weight bear as tolerated with the CAM boot. I advised the patient to ice the ankle. I would like to see the patient back in the office in 6 weeks to reassess their condition. I have addressed all the patient's concerns surrounding the pathology of their condition. The patient understood and verbally agreed to the treatment plan. All of their questions were answered and they were satisfied with the visit. The patient should call the office if they have any questions or experience worsening symptoms.

## 2020-10-04 ENCOUNTER — RX RENEWAL (OUTPATIENT)
Age: 45
End: 2020-10-04

## 2020-10-07 ENCOUNTER — RX RENEWAL (OUTPATIENT)
Age: 45
End: 2020-10-07

## 2020-10-07 RX ORDER — ASPIRIN 325 MG/1
325 TABLET, COATED ORAL
Qty: 60 | Refills: 0 | Status: ACTIVE | COMMUNITY
Start: 2020-10-04 | End: 1900-01-01

## 2020-10-28 ENCOUNTER — APPOINTMENT (OUTPATIENT)
Dept: ORTHOPEDIC SURGERY | Facility: CLINIC | Age: 45
End: 2020-10-28
Payer: COMMERCIAL

## 2020-10-28 DIAGNOSIS — S82.892A OTHER FRACTURE OF LEFT LOWER LEG, INITIAL ENCOUNTER FOR CLOSED FRACTURE: ICD-10-CM

## 2020-10-28 PROCEDURE — 99213 OFFICE O/P EST LOW 20 MIN: CPT

## 2020-10-28 PROCEDURE — 99072 ADDL SUPL MATRL&STAF TM PHE: CPT

## 2020-10-28 PROCEDURE — 73610 X-RAY EXAM OF ANKLE: CPT | Mod: LT

## 2020-10-28 NOTE — DISCUSSION/SUMMARY
[de-identified] : Today I had a lengthy discussion with the patient regarding their left ankle pain. I have addressed all the patient's concerns surrounding the pathology of their condition. I advised the patient to utilize ice. I recommend that the patient transition out of their CAM boot and into an ASO brace as tolerated. The patient was provided with the ASO brace in the office today. A new physical therapy prescription was provided today. I would like to see the patient back in the office in 3 months to reassess their condition. The patient understood and verbally agreed to the treatment plan. All of their questions were answered and they were satisfied with the visit. The patient should call the office if they have any questions or experience worsening symptoms.

## 2020-10-28 NOTE — PHYSICAL EXAM
[de-identified] : General: Alert and oriented x3. In no acute distress. Pleasant in nature with a normal affect. No apparent respiratory distress.\par \par L Ankle Exam\par Skin: Clean, dry, intact\par Inspection: No obvious malalignment, no swelling, no effusion; no lymphadenopathy\par Pulses: 2+ DP/PT pulses\par ROM: L Ankle No range of motion\par Tenderness: No tenderness over the lateral malleolus, no CFL/ATFL/PTFL pain. No medial malleolus pain, no deltoid ligament pain. No proximal fibular pain. No heel pain.\par Stability: Negative anterior/posterior drawer.\par Strength: 5/5 TA/GS/EHL\par Neuro: In tact to light touch throughout\par Additional tests: Negative Mortons test, Negative syndesmosis squeeze test.  [de-identified] : 3V of the left ankle were ordered obtained and reviewed by me today, 10/28/2020 , revealed: Hardware in good position. Ankle is fused.

## 2020-10-28 NOTE — HISTORY OF PRESENT ILLNESS
[FreeTextEntry1] : 44 year old male presenting with left ankle pain. The patient is s/p Left ankle arthrodesis, tibiotalar fusion, proximal tibia bone marrow aspirate greater than 10 mL DOS 7/24/2020. The patient’s pain is noted to be a 2/10. He presents ambulating with one crutch. The patient presents wearing a CAM boot. He reports having a tingling sensation on the top of his foot. The pain is noted to be the same compared to the previous visit. The patient has been attending physical therapy for this issue. He is currently taking no pain medication. No other complaints at this time.

## 2020-10-28 NOTE — ADDENDUM
[FreeTextEntry1] : I, Sree Quezada, acted solely as a scribe for Dr. Gibran Austin on this date 10/28/2020 .\par All medical record entries made by the Scribe were at my, Dr. Gibran Austin, direction and personally dictated by me on 10/28/2020 . I have reviewed the chart and agree that the record accurately reflects my personal performance of the history, physical exam, assessment and plan. I have also personally directed, reviewed, and agreed with the chart.

## 2021-01-28 ENCOUNTER — APPOINTMENT (OUTPATIENT)
Dept: ORTHOPEDIC SURGERY | Facility: CLINIC | Age: 46
End: 2021-01-28
Payer: COMMERCIAL
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
## 2021-01-28 DIAGNOSIS — Z01.818 ENCOUNTER FOR OTHER PREPROCEDURAL EXAMINATION: ICD-10-CM

## 2021-01-28 PROCEDURE — 99072 ADDL SUPL MATRL&STAF TM PHE: CPT

## 2021-01-28 PROCEDURE — 99213 OFFICE O/P EST LOW 20 MIN: CPT

## 2021-01-28 NOTE — DISCUSSION/SUMMARY
[de-identified] : Today I had a lengthy discussion with the patient regarding their left ankle pain. I have addressed all the patient's concerns surrounding the pathology of their condition. I would like to see the patient back in the office at the 1 year post op dominik to reassess their condition. The patient understood and verbally agreed to the treatment plan. All of their questions were answered and they were satisfied with the visit. The patient should call the office if they have any questions or experience worsening symptoms.

## 2021-01-28 NOTE — ADDENDUM
[FreeTextEntry1] : I, Sree Quezada, acted solely as a scribe for Dr. Gibran Austin on this date 01/28/2021 .\par All medical record entries made by the Scribe were at my, Dr. Gibran Austin, direction and personally dictated by me on 01/28/2021 . I have reviewed the chart and agree that the record accurately reflects my personal performance of the history, physical exam, assessment and plan. I have also personally directed, reviewed, and agreed with the chart.

## 2021-01-28 NOTE — HISTORY OF PRESENT ILLNESS
[FreeTextEntry1] : 45 year old male presenting with left ankle pain. The patient’s pain is noted to be a 1/10. The pain is noted to be 5% improved, and swelling noted to be 10% improved compared to the previous visit. The patient has been attending physical therapy for this issue and doing home exercises. He notes sometimes having increased pain when sitting ceratin ways. He c/o a tingling sensation. He is currently taking no pain medication. Patient notes he stopped taking blood thinners around 1 month ago. No other complaints at this time.

## 2021-01-28 NOTE — PHYSICAL EXAM
[de-identified] : General: Alert and oriented x3. In no acute distress. Pleasant in nature with a normal affect. No apparent respiratory distress.\par \par L Ankle Exam\par Skin: Clean, dry, intact\par Inspection: No obvious malalignment, no swelling, no effusion; no lymphadenopathy\par Pulses: 2+ DP/PT pulses\par ROM: L Ankle No range of motion\par Tenderness: No pain at the ankle. No tenderness over the lateral malleolus, no CFL/ATFL/PTFL pain. No medial malleolus pain, no deltoid ligament pain. No proximal fibular pain. No heel pain.\par Stability: Negative anterior/posterior drawer.\par Strength: 5/5 TA/GS/EHL\par Neuro: In tact to light touch throughout\par Additional tests: Negative Mortons test, Negative syndesmosis squeeze test.  [de-identified] : None new obtained.

## 2021-04-02 ENCOUNTER — TRANSCRIPTION ENCOUNTER (OUTPATIENT)
Age: 46
End: 2021-04-02

## 2021-04-07 ENCOUNTER — APPOINTMENT (OUTPATIENT)
Dept: ORTHOPEDIC SURGERY | Facility: CLINIC | Age: 46
End: 2021-04-07

## 2021-06-07 ENCOUNTER — NON-APPOINTMENT (OUTPATIENT)
Age: 46
End: 2021-06-07

## 2021-06-07 ENCOUNTER — APPOINTMENT (OUTPATIENT)
Dept: ORTHOPEDIC SURGERY | Facility: CLINIC | Age: 46
End: 2021-06-07
Payer: COMMERCIAL

## 2021-06-07 DIAGNOSIS — Z98.890 OTHER SPECIFIED POSTPROCEDURAL STATES: ICD-10-CM

## 2021-06-07 PROCEDURE — 99072 ADDL SUPL MATRL&STAF TM PHE: CPT

## 2021-06-07 PROCEDURE — 99213 OFFICE O/P EST LOW 20 MIN: CPT

## 2021-06-07 PROCEDURE — 73610 X-RAY EXAM OF ANKLE: CPT | Mod: LT

## 2021-06-07 NOTE — DISCUSSION/SUMMARY
[de-identified] : Today I had a lengthy discussion with the patient regarding his left ankle s/p Left ankle arthrodesis, tibiotalar fusion, proximal tibia bone marrow aspirate greater than 10 mL on 7/24/2020. I have addressed all the patient's concerns surrounding the pathology of their condition. XR films were reviewed with the patient. \par \par I recommend that the patient utilize ice, heat, NSAIDs prn. They can also elevate their left ankle above the level of the heart. \par \par I would like to see the patient back in the office prn to reassess their condition. The patient understood and verbally agreed to the treatment plan. All of their questions were answered and they were satisfied with the visit. The patient should call the office if they have any questions or experience worsening symptoms.

## 2021-06-07 NOTE — ADDENDUM
[FreeTextEntry1] : I, Randy Diallo, acted solely as a scribe for Dr. Gibran Austin on this date 06/07/2021  .\par  \par All medical record entries made by the Scribe were at my, Dr. Gibran Austin, direction and personally dictated by me on 06/07/2021 . I have reviewed the chart and agree that the record accurately reflects my personal performance of the history, physical exam, assessment and plan. I have also personally directed, reviewed, and agreed with the chart.

## 2021-06-07 NOTE — HISTORY OF PRESENT ILLNESS
[FreeTextEntry1] : MICHELLE GLEZ is a 45 year old male who presents for follow-up evaluation of his left ankle s/p Left ankle arthrodesis, tibiotalar fusion, proximal tibia bone marrow aspirate greater than 10 mL on 7/24/2020. He notes some occasional sharp pain. He states the medial swelling has improved. He is currently taking no pain medication. No other complaints at this time.

## 2021-06-07 NOTE — PHYSICAL EXAM
[de-identified] : General: Alert and oriented x3. In no acute distress. Pleasant in nature with a normal affect. No apparent respiratory distress.\par \par L Ankle Exam\par Skin: Clean, dry, intact\par Inspection: No obvious malalignment, no swelling, no effusion; no lymphadenopathy\par Pulses: 2+ DP/PT pulses\par ROM: L Ankle No range of motion\par Tenderness: No pain at the ankle. No tenderness over the lateral malleolus, no CFL/ATFL/PTFL pain. No medial malleolus pain, no deltoid ligament pain. No proximal fibular pain. No heel pain.\par Stability: Negative anterior/posterior drawer.\par Strength: 5/5 TA/GS/EHL\par Neuro: In tact to light touch throughout\par Additional tests: Negative Mortons test, Negative syndesmosis squeeze test.  [de-identified] : 3V of the left ankle were ordered, obtained, and reviewed by me today, 06/07/2021 , which revealed: Hardware intact, good position.

## 2023-03-26 NOTE — ASU DISCHARGE PLAN (ADULT/PEDIATRIC) - CARE PROVIDER_API CALL
Gibran Austin  ORTHOPAEDIC SURGERY  [address] [place] [address]  Phone: [phone]  Fax: [phone]  Follow Up Time: No

## 2024-01-15 NOTE — ADDENDUM
[FreeTextEntry1] : I, Sree Quezada, acted solely as a scribe for Dr. Gibran Austin on this date 09/16/2020 .\par All medical record entries made by the Scribe were at my, Dr. Gibran Austin, direction and personally dictated by me on 09/16/2020 . I have reviewed the chart and agree that the record accurately reflects my personal performance of the history, physical exam, assessment and plan. I have also personally directed, reviewed, and agreed with the chart.  0 (no pain/absence of nonverbal indicators of pain)

## 2025-02-10 ENCOUNTER — EMERGENCY (EMERGENCY)
Facility: HOSPITAL | Age: 50
LOS: 1 days | Discharge: HOME | End: 2025-02-10
Attending: STUDENT IN AN ORGANIZED HEALTH CARE EDUCATION/TRAINING PROGRAM | Admitting: STUDENT IN AN ORGANIZED HEALTH CARE EDUCATION/TRAINING PROGRAM
Payer: COMMERCIAL

## 2025-02-10 ENCOUNTER — NON-APPOINTMENT (OUTPATIENT)
Age: 50
End: 2025-02-10

## 2025-02-10 VITALS
SYSTOLIC BLOOD PRESSURE: 142 MMHG | OXYGEN SATURATION: 98 % | DIASTOLIC BLOOD PRESSURE: 88 MMHG | TEMPERATURE: 98 F | RESPIRATION RATE: 18 BRPM | HEART RATE: 95 BPM

## 2025-02-10 VITALS
HEART RATE: 108 BPM | DIASTOLIC BLOOD PRESSURE: 103 MMHG | SYSTOLIC BLOOD PRESSURE: 154 MMHG | RESPIRATION RATE: 19 BRPM | HEIGHT: 68 IN | TEMPERATURE: 98 F | WEIGHT: 240.08 LBS | OXYGEN SATURATION: 97 %

## 2025-02-10 DIAGNOSIS — Z98.890 OTHER SPECIFIED POSTPROCEDURAL STATES: Chronic | ICD-10-CM

## 2025-02-10 LAB
ALBUMIN SERPL ELPH-MCNC: 3.9 G/DL — SIGNIFICANT CHANGE UP (ref 3.3–5)
ALP SERPL-CCNC: 123 U/L — HIGH (ref 40–120)
ALT FLD-CCNC: 70 U/L — SIGNIFICANT CHANGE UP (ref 12–78)
ANION GAP SERPL CALC-SCNC: 7 MMOL/L — SIGNIFICANT CHANGE UP (ref 5–17)
AST SERPL-CCNC: 31 U/L — SIGNIFICANT CHANGE UP (ref 15–37)
BASOPHILS # BLD AUTO: 0.05 K/UL — SIGNIFICANT CHANGE UP (ref 0–0.2)
BASOPHILS NFR BLD AUTO: 0.7 % — SIGNIFICANT CHANGE UP (ref 0–2)
BILIRUB SERPL-MCNC: 0.4 MG/DL — SIGNIFICANT CHANGE UP (ref 0.2–1.2)
BUN SERPL-MCNC: 9 MG/DL — SIGNIFICANT CHANGE UP (ref 7–23)
CALCIUM SERPL-MCNC: 10.1 MG/DL — SIGNIFICANT CHANGE UP (ref 8.5–10.1)
CHLORIDE SERPL-SCNC: 104 MMOL/L — SIGNIFICANT CHANGE UP (ref 96–108)
CO2 SERPL-SCNC: 26 MMOL/L — SIGNIFICANT CHANGE UP (ref 22–31)
CREAT SERPL-MCNC: 0.84 MG/DL — SIGNIFICANT CHANGE UP (ref 0.5–1.3)
EGFR: 108 ML/MIN/1.73M2 — SIGNIFICANT CHANGE UP
EOSINOPHIL # BLD AUTO: 0.11 K/UL — SIGNIFICANT CHANGE UP (ref 0–0.5)
EOSINOPHIL NFR BLD AUTO: 1.5 % — SIGNIFICANT CHANGE UP (ref 0–6)
GLUCOSE SERPL-MCNC: 309 MG/DL — HIGH (ref 70–99)
HCT VFR BLD CALC: 49.2 % — SIGNIFICANT CHANGE UP (ref 39–50)
HGB BLD-MCNC: 17.4 G/DL — HIGH (ref 13–17)
IMM GRANULOCYTES NFR BLD AUTO: 0.6 % — SIGNIFICANT CHANGE UP (ref 0–0.9)
LYMPHOCYTES # BLD AUTO: 2.19 K/UL — SIGNIFICANT CHANGE UP (ref 1–3.3)
LYMPHOCYTES # BLD AUTO: 30.5 % — SIGNIFICANT CHANGE UP (ref 13–44)
MAGNESIUM SERPL-MCNC: 2.1 MG/DL — SIGNIFICANT CHANGE UP (ref 1.6–2.6)
MCHC RBC-ENTMCNC: 29.8 PG — SIGNIFICANT CHANGE UP (ref 27–34)
MCHC RBC-ENTMCNC: 35.4 G/DL — SIGNIFICANT CHANGE UP (ref 32–36)
MCV RBC AUTO: 84.2 FL — SIGNIFICANT CHANGE UP (ref 80–100)
MONOCYTES # BLD AUTO: 0.59 K/UL — SIGNIFICANT CHANGE UP (ref 0–0.9)
MONOCYTES NFR BLD AUTO: 8.2 % — SIGNIFICANT CHANGE UP (ref 2–14)
NEUTROPHILS # BLD AUTO: 4.21 K/UL — SIGNIFICANT CHANGE UP (ref 1.8–7.4)
NEUTROPHILS NFR BLD AUTO: 58.5 % — SIGNIFICANT CHANGE UP (ref 43–77)
NRBC # BLD: 0 /100 WBCS — SIGNIFICANT CHANGE UP (ref 0–0)
NRBC BLD-RTO: 0 /100 WBCS — SIGNIFICANT CHANGE UP (ref 0–0)
PLATELET # BLD AUTO: 152 K/UL — SIGNIFICANT CHANGE UP (ref 150–400)
POTASSIUM SERPL-MCNC: 3.9 MMOL/L — SIGNIFICANT CHANGE UP (ref 3.5–5.3)
POTASSIUM SERPL-SCNC: 3.9 MMOL/L — SIGNIFICANT CHANGE UP (ref 3.5–5.3)
PROT SERPL-MCNC: 7.9 G/DL — SIGNIFICANT CHANGE UP (ref 6–8.3)
RBC # BLD: 5.84 M/UL — HIGH (ref 4.2–5.8)
RBC # FLD: 12.8 % — SIGNIFICANT CHANGE UP (ref 10.3–14.5)
SODIUM SERPL-SCNC: 137 MMOL/L — SIGNIFICANT CHANGE UP (ref 135–145)
TROPONIN I, HIGH SENSITIVITY RESULT: 3.8 NG/L — SIGNIFICANT CHANGE UP
WBC # BLD: 7.19 K/UL — SIGNIFICANT CHANGE UP (ref 3.8–10.5)
WBC # FLD AUTO: 7.19 K/UL — SIGNIFICANT CHANGE UP (ref 3.8–10.5)

## 2025-02-10 PROCEDURE — 83735 ASSAY OF MAGNESIUM: CPT

## 2025-02-10 PROCEDURE — 71045 X-RAY EXAM CHEST 1 VIEW: CPT

## 2025-02-10 PROCEDURE — 99285 EMERGENCY DEPT VISIT HI MDM: CPT | Mod: 25

## 2025-02-10 PROCEDURE — 84484 ASSAY OF TROPONIN QUANT: CPT

## 2025-02-10 PROCEDURE — 99285 EMERGENCY DEPT VISIT HI MDM: CPT

## 2025-02-10 PROCEDURE — 71045 X-RAY EXAM CHEST 1 VIEW: CPT | Mod: 26

## 2025-02-10 PROCEDURE — 80053 COMPREHEN METABOLIC PANEL: CPT

## 2025-02-10 PROCEDURE — 93005 ELECTROCARDIOGRAM TRACING: CPT

## 2025-02-10 PROCEDURE — 93010 ELECTROCARDIOGRAM REPORT: CPT

## 2025-02-10 PROCEDURE — 85025 COMPLETE CBC W/AUTO DIFF WBC: CPT

## 2025-02-10 PROCEDURE — 36415 COLL VENOUS BLD VENIPUNCTURE: CPT

## 2025-02-10 PROCEDURE — 96374 THER/PROPH/DIAG INJ IV PUSH: CPT

## 2025-02-10 RX ORDER — MAGNESIUM, ALUMINUM HYDROXIDE 200-225/5
30 SUSPENSION, ORAL (FINAL DOSE FORM) ORAL ONCE
Refills: 0 | Status: COMPLETED | OUTPATIENT
Start: 2025-02-10 | End: 2025-02-10

## 2025-02-10 RX ORDER — BACTERIOSTATIC SODIUM CHLORIDE 0.9 %
1000 VIAL (ML) INJECTION ONCE
Refills: 0 | Status: COMPLETED | OUTPATIENT
Start: 2025-02-10 | End: 2025-02-10

## 2025-02-10 RX ORDER — FAMOTIDINE 10 MG/ML
20 INJECTION INTRAVENOUS ONCE
Refills: 0 | Status: COMPLETED | OUTPATIENT
Start: 2025-02-10 | End: 2025-02-10

## 2025-02-10 RX ADMIN — FAMOTIDINE 20 MILLIGRAM(S): 10 INJECTION INTRAVENOUS at 14:30

## 2025-02-10 RX ADMIN — Medication 30 MILLILITER(S): at 14:32

## 2025-02-10 RX ADMIN — Medication 1000 MILLILITER(S): at 14:30

## 2025-02-10 NOTE — ED PROVIDER NOTE - NSFOLLOWUPCLINICS_GEN_ALL_ED_FT
[facility] at [id number] [place] [address]  Phone: [phone]  Fax:

## 2025-02-10 NOTE — ED ADULT NURSE NOTE - NSFALLUNIVINTERV_ED_ALL_ED
Bed/Stretcher in lowest position, wheels locked, appropriate side rails in place/Call bell, personal items and telephone in reach/Instruct patient to call for assistance before getting out of bed/chair/stretcher/Non-slip footwear applied when patient is off stretcher/[place] to call system/Physically safe environment - no spills, clutter or unnecessary equipment/Purposeful proactive rounding/Room/bathroom lighting operational, light cord in reach

## 2025-02-10 NOTE — ED PROVIDER NOTE - NSFOLLOWUPINSTRUCTIONS_ED_ALL_ED_FT
Please return to the Emergency Department immediately for worsening pain, shortness of breath, vomiting and if you have any other problems or concerns. Please follow up with your Primary Care Physician within the next 2 days.    Please take any prescription medications prescribed as instructed    Please follow up with cardiology within the next 2 days as well      CHEST PAIN - General Information    Chest Pain    WHAT YOU NEED TO KNOW:    What do I need to know about chest pain? Chest pain can be caused by a range of conditions, from not serious to life-threatening. It is important to follow up with your healthcare provider to find the cause of your chest pain.    What may cause or increase my risk for chest pain?    A digestion problem, such as acid reflux or a stomach ulcer    An anxiety attack or a strong emotion, such as anger    Infection, inflammation, or a fracture in the bones or cartilage in your chest    Poor blood flow to your heart (angina)    A life-threatening condition, such as a heart attack or blood clot in your lungs  What other symptoms might I have with chest pain?    A burning feeling behind your breastbone    A racing or slow heartbeat    Fever or sweating    Nausea or vomiting    Shortness of breath    Discomfort or pressure that spreads from your chest to your back, jaw, or arm    Feeling weak, tired, or faint  How is the cause of chest pain diagnosed? Your healthcare provider will examine you. Describe your chest pain in as much detail as possible. Tell him or her where your pain is and when it began. Tell the provider if you notice anything that makes the pain worse or better. Tell him or her if it is constant or comes and goes. Also include any other symptoms you have with the chest pain, such as sweating or nausea. Your provider will ask about any medicines you use and medical conditions you have. Tell him or her if you have a family history of heart disease. You may also need any of the following tests:    An EKG is a test that records your heart's electrical activity.    Blood tests check for heart damage and signs of a heart attack.    An echocardiogram uses sound waves to see if blood is flowing normally through your heart.    An ultrasound, x-ray, CT, or MRI scan may show the cause of your chest pain. You may be given contrast liquid to help your heart show up better in the pictures. Tell the healthcare provider if you have ever had an allergic reaction to contrast liquid. Do not enter the MRI room with anything metal. Metal can cause serious injury. Tell the provider if you have any metal in or on your body.    An endoscopy may be done to check for ulcers or problem with your esophagus.  Upper Endoscopy  How is chest pain treated?    Medicines may be given to treat the cause of your chest pain. Examples include pain medicine, anxiety medicine, or medicines to increase blood flow to your heart. Do not take certain medicines without asking your healthcare provider first. These include NSAIDs, herbal or vitamin supplements, and hormones, such as estrogen or progestin.    A stent may be placed if your chest pain is caused by blockage in your heart. A stent is a wire mesh tube that helps hold your artery open. You may need more than 1 stent.  Coronary Artery Angioplasty (Stent)  What are some healthy living tips? If the cause of your chest pain is known, your healthcare provider will give you specific guidelines to follow. The following are general healthy guidelines:    Do not smoke. Nicotine and other chemicals in cigarettes and cigars can cause lung and heart damage. Ask your healthcare provider for information if you currently smoke and need help to quit. E-cigarettes or smokeless tobacco still contain nicotine. Talk to your healthcare provider before you use these products.    Choose a variety of healthy foods as often as possible. Include fresh, frozen, or canned fruits and vegetables. Also include low-fat dairy products, fish, chicken (without skin), and lean meats. Your healthcare provider or a dietitian can help you create meal plans. You may need to avoid certain foods or drinks if your pain is caused by a digestion problem.  Healthy Foods      Lower your sodium (salt) intake. Limit foods that are high in sodium, such as canned foods, salty snacks, and cold cuts. If you add salt when you cook food, do not add more at the table. Choose low-sodium canned foods as much as possible.        Drink plenty of water every day. Water helps your body to control your temperature and blood pressure. Ask your healthcare provider how much water you should drink every day.    Ask about activity. Your healthcare provider will tell you which activities to limit or avoid. Ask when you can drive, return to work, and have sex. Ask about the best exercise plan for you.    Maintain a healthy weight. Ask your healthcare provider what a healthy weight is for you. Ask him or her to help you create a weight loss plan if you are overweight.    Ask about vaccines you may need. Your healthcare provider can tell you which vaccines you need, and when to get them. The following vaccines help prevent diseases that can become serious for a person with a heart condition:  The influenza (flu) vaccine is given each year. Get a flu vaccine as soon as recommended, usually in September or October.    The pneumonia vaccine is usually given every 5 years. Your healthcare provider may recommend the pneumonia vaccine if you are 65 or older.    COVID-19 vaccines are given to adults as a shot. At least 1 dose of an updated vaccine is recommended for all adults. COVID-19 vaccines are updated throughout the year. Adults 65 or older need a second dose of updated vaccine at least 4 months after the first dose. Your healthcare provider can help you schedule all needed doses as updated vaccines become available.  Prevent Heart Disease   Call your local emergency number (911 in the [place]) or have someone call if:    You have any of the following signs of a heart attack:  Squeezing, pressure, or pain in your chest    You may also have any of the following:  Discomfort or pain in your back, neck, jaw, stomach, or arm    Shortness of breath    Nausea or vomiting    Lightheadedness or a sudden cold sweat    When should I seek immediate care?    You have chest discomfort that gets worse, even with medicine.    You cough or vomit blood.    Your bowel movements are black or bloody.    You cannot stop vomiting, or it hurts to swallow.  When should I call my doctor?    You have questions or concerns about your condition or care.    CARE AGREEMENT:    You have the right to help plan your care. Learn about your health condition and how it may be treated. Discuss treatment options with your healthcare providers to decide what care you want to receive. You always have the right to refuse treatment.

## 2025-02-10 NOTE — ED ADULT NURSE NOTE - OBJECTIVE STATEMENT
A&Ox4 from triage to ED with complaints of chest pain x 2 weeks. denies SOB/dizziness. feels "sore." denies heavy lifting. sinus tachy on bedside cardiac monitor. EKG obtained. skin warm and dry, speaking in full sentences.

## 2025-02-10 NOTE — ED PROVIDER NOTE - PATIENT PORTAL LINK FT
You can access the FollowMyHealth Patient Portal offered by [facility] by registering at the following website: [email or web address]. By joining [facility]’s FollowMyHealth portal, you will also be able to view your health information using other applications (apps) compatible with our system.

## 2025-02-10 NOTE — ED PROVIDER NOTE - CLINICAL SUMMARY MEDICAL DECISION MAKING FREE TEXT BOX
49M with no PMh who presents with chest pain. patient reports pain for the past 10 days, worsens in the morning on waking, feels like pressure. he has no h/o cardiac disease, former smoker and now vapes. denies sob, prajapati, nausea, LH.    plan for labs, cardiac monitoring. imaging, medications 49M with no PMh who presents with chest pain. patient reports pain for the past 10 days, worsens in the morning on waking, feels like pressure. he has no h/o cardiac disease, former smoker and now vapes. denies sob, prajapati, nausea, LH.    plan for labs, cardiac monitoring. imaging, medications    Patient was re-evaluated at this time and they are feeling much better. his troponin is negative and cXr normal. he is feeling better, labs only remarkable for elevated glucose. his pain has been ongoing for 10 days, no indication for repeat troponin in the setting of low risk chest pain. he was given outpatient follow up. The Patient will be discharged home at this time. Their lab and/or imaging results were explained with the patient and they were instructed to go over them with their PCP. All questions were answered at this time.     Patient was told to follow up with their PCP within the next 2 days. they were told to return to the ED if they have worsening pain, shortness of breath    patient will be discharged home at this time, they are in agreement with the plan

## 2025-02-10 NOTE — ED PROVIDER NOTE - CARE PROVIDER_API CALL
Susan Seaman  Cardiology  [address] [place] [address]  Phone: [phone]  Fax: [phone]  Follow Up Time:

## 2025-02-10 NOTE — ED PROVIDER NOTE - OBJECTIVE STATEMENT
49M with no PMh who presents with chest pain. patient reports pain for the past 10 days, worsens in the morning on waking, feels like pressure. he has no h/o cardiac disease, former smoker and now vapes. denies sob, prajapati, nausea, LH.